# Patient Record
Sex: MALE | Race: ASIAN | NOT HISPANIC OR LATINO | URBAN - METROPOLITAN AREA
[De-identification: names, ages, dates, MRNs, and addresses within clinical notes are randomized per-mention and may not be internally consistent; named-entity substitution may affect disease eponyms.]

---

## 2018-04-26 ENCOUNTER — INPATIENT (INPATIENT)
Facility: HOSPITAL | Age: 58
LOS: 6 days | Discharge: HOME | End: 2018-05-03
Attending: INTERNAL MEDICINE | Admitting: INTERNAL MEDICINE
Payer: SELF-PAY

## 2018-04-26 VITALS
OXYGEN SATURATION: 98 % | RESPIRATION RATE: 36 BRPM | HEART RATE: 92 BPM | DIASTOLIC BLOOD PRESSURE: 108 MMHG | SYSTOLIC BLOOD PRESSURE: 172 MMHG

## 2018-04-26 DIAGNOSIS — I50.9 HEART FAILURE, UNSPECIFIED: ICD-10-CM

## 2018-04-26 DIAGNOSIS — J96.91 RESPIRATORY FAILURE, UNSPECIFIED WITH HYPOXIA: ICD-10-CM

## 2018-04-26 DIAGNOSIS — Z90.49 ACQUIRED ABSENCE OF OTHER SPECIFIED PARTS OF DIGESTIVE TRACT: Chronic | ICD-10-CM

## 2018-04-26 DIAGNOSIS — I48.91 UNSPECIFIED ATRIAL FIBRILLATION: ICD-10-CM

## 2018-04-26 DIAGNOSIS — N17.9 ACUTE KIDNEY FAILURE, UNSPECIFIED: ICD-10-CM

## 2018-04-26 DIAGNOSIS — I25.10 ATHEROSCLEROTIC HEART DISEASE OF NATIVE CORONARY ARTERY WITHOUT ANGINA PECTORIS: ICD-10-CM

## 2018-04-26 DIAGNOSIS — Z29.9 ENCOUNTER FOR PROPHYLACTIC MEASURES, UNSPECIFIED: ICD-10-CM

## 2018-04-26 DIAGNOSIS — Z95.0 PRESENCE OF CARDIAC PACEMAKER: Chronic | ICD-10-CM

## 2018-04-26 DIAGNOSIS — E11.9 TYPE 2 DIABETES MELLITUS WITHOUT COMPLICATIONS: ICD-10-CM

## 2018-04-26 DIAGNOSIS — E87.2 ACIDOSIS: ICD-10-CM

## 2018-04-26 LAB
ALBUMIN SERPL ELPH-MCNC: 4.2 G/DL — SIGNIFICANT CHANGE UP (ref 3.3–5)
ALBUMIN SERPL ELPH-MCNC: 4.3 G/DL — SIGNIFICANT CHANGE UP (ref 3.3–5)
ALP SERPL-CCNC: 104 U/L — SIGNIFICANT CHANGE UP (ref 40–120)
ALP SERPL-CCNC: 76 U/L — SIGNIFICANT CHANGE UP (ref 40–120)
ALT FLD-CCNC: 19 U/L — SIGNIFICANT CHANGE UP (ref 4–41)
ALT FLD-CCNC: 24 U/L — SIGNIFICANT CHANGE UP (ref 4–41)
APTT BLD: 38.6 SEC — HIGH (ref 27.5–37.4)
AST SERPL-CCNC: 25 U/L — SIGNIFICANT CHANGE UP (ref 4–40)
AST SERPL-CCNC: 30 U/L — SIGNIFICANT CHANGE UP (ref 4–40)
B-OH-BUTYR SERPL-SCNC: 0.1 MMOL/L — SIGNIFICANT CHANGE UP (ref 0–0.4)
B-OH-BUTYR SERPL-SCNC: 0.1 MMOL/L — SIGNIFICANT CHANGE UP (ref 0–0.4)
BASE EXCESS BLDV CALC-SCNC: -0.8 MMOL/L — SIGNIFICANT CHANGE UP
BASE EXCESS BLDV CALC-SCNC: -2.5 MMOL/L — SIGNIFICANT CHANGE UP
BASE EXCESS BLDV CALC-SCNC: -7.2 MMOL/L — SIGNIFICANT CHANGE UP
BASOPHILS # BLD AUTO: 0.08 K/UL — SIGNIFICANT CHANGE UP (ref 0–0.2)
BASOPHILS NFR BLD AUTO: 0.6 % — SIGNIFICANT CHANGE UP (ref 0–2)
BASOPHILS NFR SPEC: 0 % — SIGNIFICANT CHANGE UP (ref 0–2)
BILIRUB SERPL-MCNC: 0.4 MG/DL — SIGNIFICANT CHANGE UP (ref 0.2–1.2)
BILIRUB SERPL-MCNC: 0.4 MG/DL — SIGNIFICANT CHANGE UP (ref 0.2–1.2)
BLASTS # FLD: 0 % — SIGNIFICANT CHANGE UP (ref 0–0)
BLOOD GAS VENOUS - CREATININE: 1.8 MG/DL — HIGH (ref 0.5–1.3)
BLOOD GAS VENOUS - CREATININE: 1.82 MG/DL — HIGH (ref 0.5–1.3)
BUN SERPL-MCNC: 30 MG/DL — HIGH (ref 7–23)
BUN SERPL-MCNC: 33 MG/DL — HIGH (ref 7–23)
CALCIUM SERPL-MCNC: 9.1 MG/DL — SIGNIFICANT CHANGE UP (ref 8.4–10.5)
CALCIUM SERPL-MCNC: 9.3 MG/DL — SIGNIFICANT CHANGE UP (ref 8.4–10.5)
CHLORIDE BLDV-SCNC: 104 MMOL/L — SIGNIFICANT CHANGE UP (ref 96–108)
CHLORIDE BLDV-SCNC: 106 MMOL/L — SIGNIFICANT CHANGE UP (ref 96–108)
CHLORIDE SERPL-SCNC: 100 MMOL/L — SIGNIFICANT CHANGE UP (ref 98–107)
CHLORIDE SERPL-SCNC: 104 MMOL/L — SIGNIFICANT CHANGE UP (ref 98–107)
CO2 SERPL-SCNC: 16 MMOL/L — LOW (ref 22–31)
CO2 SERPL-SCNC: 25 MMOL/L — SIGNIFICANT CHANGE UP (ref 22–31)
CREAT SERPL-MCNC: 1.81 MG/DL — HIGH (ref 0.5–1.3)
CREAT SERPL-MCNC: 1.87 MG/DL — HIGH (ref 0.5–1.3)
DIGOXIN SERPL-MCNC: 1 NG/ML — SIGNIFICANT CHANGE UP (ref 0.8–2)
EOSINOPHIL # BLD AUTO: 0.3 K/UL — SIGNIFICANT CHANGE UP (ref 0–0.5)
EOSINOPHIL NFR BLD AUTO: 2.4 % — SIGNIFICANT CHANGE UP (ref 0–6)
EOSINOPHIL NFR FLD: 2.7 % — SIGNIFICANT CHANGE UP (ref 0–6)
GAS PNL BLDV: 140 MMOL/L — SIGNIFICANT CHANGE UP (ref 136–146)
GAS PNL BLDV: 140 MMOL/L — SIGNIFICANT CHANGE UP (ref 136–146)
GAS PNL BLDV: 142 MMOL/L — SIGNIFICANT CHANGE UP (ref 136–146)
GIANT PLATELETS BLD QL SMEAR: PRESENT — SIGNIFICANT CHANGE UP
GLUCOSE BLDC GLUCOMTR-MCNC: 178 MG/DL — HIGH (ref 70–99)
GLUCOSE BLDC GLUCOMTR-MCNC: 226 MG/DL — HIGH (ref 70–99)
GLUCOSE BLDV-MCNC: 119 — HIGH (ref 70–99)
GLUCOSE BLDV-MCNC: 332 — HIGH (ref 70–99)
GLUCOSE BLDV-MCNC: 386 — HIGH (ref 70–99)
GLUCOSE SERPL-MCNC: 137 MG/DL — HIGH (ref 70–99)
GLUCOSE SERPL-MCNC: 377 MG/DL — HIGH (ref 70–99)
HCO3 BLDV-SCNC: 16 MMOL/L — LOW (ref 20–27)
HCO3 BLDV-SCNC: 19 MMOL/L — LOW (ref 20–27)
HCO3 BLDV-SCNC: 22 MMOL/L — SIGNIFICANT CHANGE UP (ref 20–27)
HCT VFR BLD CALC: 41.6 % — SIGNIFICANT CHANGE UP (ref 39–50)
HCT VFR BLD CALC: 45.8 % — SIGNIFICANT CHANGE UP (ref 39–50)
HCT VFR BLDV CALC: 33.2 % — LOW (ref 39–51)
HCT VFR BLDV CALC: 42.3 % — SIGNIFICANT CHANGE UP (ref 39–51)
HCT VFR BLDV CALC: 44.1 % — SIGNIFICANT CHANGE UP (ref 39–51)
HGB BLD-MCNC: 12.9 G/DL — LOW (ref 13–17)
HGB BLD-MCNC: 14.2 G/DL — SIGNIFICANT CHANGE UP (ref 13–17)
HGB BLDV-MCNC: 10.8 G/DL — LOW (ref 13–17)
HGB BLDV-MCNC: 13.8 G/DL — SIGNIFICANT CHANGE UP (ref 13–17)
HGB BLDV-MCNC: 14.4 G/DL — SIGNIFICANT CHANGE UP (ref 13–17)
IMM GRANULOCYTES # BLD AUTO: 0.07 # — SIGNIFICANT CHANGE UP
IMM GRANULOCYTES NFR BLD AUTO: 0.6 % — SIGNIFICANT CHANGE UP (ref 0–1.5)
INR BLD: 1.78 — HIGH (ref 0.88–1.17)
LACTATE BLDV-MCNC: 3.3 MMOL/L — HIGH (ref 0.5–2)
LACTATE BLDV-MCNC: 6.1 MMOL/L — CRITICAL HIGH (ref 0.5–2)
LACTATE SERPL-SCNC: 1.4 MMOL/L — SIGNIFICANT CHANGE UP (ref 0.5–2)
LYMPHOCYTES # BLD AUTO: 47.1 % — HIGH (ref 13–44)
LYMPHOCYTES # BLD AUTO: 5.88 K/UL — HIGH (ref 1–3.3)
LYMPHOCYTES NFR SPEC AUTO: 38 % — SIGNIFICANT CHANGE UP (ref 13–44)
MAGNESIUM SERPL-MCNC: 2 MG/DL — SIGNIFICANT CHANGE UP (ref 1.6–2.6)
MCHC RBC-ENTMCNC: 28.2 PG — SIGNIFICANT CHANGE UP (ref 27–34)
MCHC RBC-ENTMCNC: 28.8 PG — SIGNIFICANT CHANGE UP (ref 27–34)
MCHC RBC-ENTMCNC: 31 % — LOW (ref 32–36)
MCHC RBC-ENTMCNC: 31 % — LOW (ref 32–36)
MCV RBC AUTO: 90.8 FL — SIGNIFICANT CHANGE UP (ref 80–100)
MCV RBC AUTO: 92.9 FL — SIGNIFICANT CHANGE UP (ref 80–100)
METAMYELOCYTES # FLD: 0 % — SIGNIFICANT CHANGE UP (ref 0–1)
MONOCYTES # BLD AUTO: 0.8 K/UL — SIGNIFICANT CHANGE UP (ref 0–0.9)
MONOCYTES NFR BLD AUTO: 6.4 % — SIGNIFICANT CHANGE UP (ref 2–14)
MONOCYTES NFR BLD: 6.2 % — SIGNIFICANT CHANGE UP (ref 2–9)
MORPHOLOGY BLD-IMP: NORMAL — SIGNIFICANT CHANGE UP
MYELOCYTES NFR BLD: 0 % — SIGNIFICANT CHANGE UP (ref 0–0)
NEUTROPHIL AB SER-ACNC: 45.1 % — SIGNIFICANT CHANGE UP (ref 43–77)
NEUTROPHILS # BLD AUTO: 5.35 K/UL — SIGNIFICANT CHANGE UP (ref 1.8–7.4)
NEUTROPHILS NFR BLD AUTO: 42.9 % — LOW (ref 43–77)
NEUTS BAND # BLD: 0 % — SIGNIFICANT CHANGE UP (ref 0–6)
NRBC # FLD: 0 — SIGNIFICANT CHANGE UP
NRBC # FLD: 0.02 — SIGNIFICANT CHANGE UP
NT-PROBNP SERPL-SCNC: 1810 PG/ML — SIGNIFICANT CHANGE UP
OTHER - HEMATOLOGY %: 0 — SIGNIFICANT CHANGE UP
PCO2 BLDV: 56 MMHG — HIGH (ref 41–51)
PCO2 BLDV: 78 MMHG — HIGH (ref 41–51)
PCO2 BLDV: 79 MMHG — HIGH (ref 41–51)
PH BLDV: 7.07 PH — CRITICAL LOW (ref 7.32–7.43)
PH BLDV: 7.14 PH — CRITICAL LOW (ref 7.32–7.43)
PH BLDV: 7.28 PH — LOW (ref 7.32–7.43)
PHOSPHATE SERPL-MCNC: 4.6 MG/DL — HIGH (ref 2.5–4.5)
PLATELET # BLD AUTO: 180 K/UL — SIGNIFICANT CHANGE UP (ref 150–400)
PLATELET # BLD AUTO: 188 K/UL — SIGNIFICANT CHANGE UP (ref 150–400)
PLATELET COUNT - ESTIMATE: NORMAL — SIGNIFICANT CHANGE UP
PMV BLD: 12.1 FL — SIGNIFICANT CHANGE UP (ref 7–13)
PMV BLD: 12.5 FL — SIGNIFICANT CHANGE UP (ref 7–13)
PO2 BLDV: 36 MMHG — SIGNIFICANT CHANGE UP (ref 35–40)
PO2 BLDV: 55 MMHG — HIGH (ref 35–40)
PO2 BLDV: < 24 MMHG — LOW (ref 35–40)
POTASSIUM BLDV-SCNC: 3.3 MMOL/L — LOW (ref 3.4–4.5)
POTASSIUM BLDV-SCNC: 5.1 MMOL/L — HIGH (ref 3.4–4.5)
POTASSIUM BLDV-SCNC: 5.2 MMOL/L — HIGH (ref 3.4–4.5)
POTASSIUM SERPL-MCNC: 3.6 MMOL/L — SIGNIFICANT CHANGE UP (ref 3.5–5.3)
POTASSIUM SERPL-MCNC: 5 MMOL/L — SIGNIFICANT CHANGE UP (ref 3.5–5.3)
POTASSIUM SERPL-SCNC: 3.6 MMOL/L — SIGNIFICANT CHANGE UP (ref 3.5–5.3)
POTASSIUM SERPL-SCNC: 5 MMOL/L — SIGNIFICANT CHANGE UP (ref 3.5–5.3)
PROMYELOCYTES # FLD: 0 % — SIGNIFICANT CHANGE UP (ref 0–0)
PROT SERPL-MCNC: 7.5 G/DL — SIGNIFICANT CHANGE UP (ref 6–8.3)
PROT SERPL-MCNC: 8.1 G/DL — SIGNIFICANT CHANGE UP (ref 6–8.3)
PROTHROM AB SERPL-ACNC: 20 SEC — HIGH (ref 9.8–13.1)
RBC # BLD: 4.58 M/UL — SIGNIFICANT CHANGE UP (ref 4.2–5.8)
RBC # BLD: 4.93 M/UL — SIGNIFICANT CHANGE UP (ref 4.2–5.8)
RBC # FLD: 13.6 % — SIGNIFICANT CHANGE UP (ref 10.3–14.5)
RBC # FLD: 13.7 % — SIGNIFICANT CHANGE UP (ref 10.3–14.5)
SAO2 % BLDV: 22.6 % — LOW (ref 60–85)
SAO2 % BLDV: 59.1 % — LOW (ref 60–85)
SAO2 % BLDV: 72 % — SIGNIFICANT CHANGE UP (ref 60–85)
SMUDGE CELLS # BLD: PRESENT — SIGNIFICANT CHANGE UP
SODIUM SERPL-SCNC: 139 MMOL/L — SIGNIFICANT CHANGE UP (ref 135–145)
SODIUM SERPL-SCNC: 144 MMOL/L — SIGNIFICANT CHANGE UP (ref 135–145)
TROPONIN T SERPL-MCNC: < 0.06 NG/ML — SIGNIFICANT CHANGE UP (ref 0–0.06)
VARIANT LYMPHS # BLD: 8 % — SIGNIFICANT CHANGE UP
WBC # BLD: 12.48 K/UL — HIGH (ref 3.8–10.5)
WBC # BLD: 8.61 K/UL — SIGNIFICANT CHANGE UP (ref 3.8–10.5)
WBC # FLD AUTO: 12.48 K/UL — HIGH (ref 3.8–10.5)
WBC # FLD AUTO: 8.61 K/UL — SIGNIFICANT CHANGE UP (ref 3.8–10.5)

## 2018-04-26 PROCEDURE — 71045 X-RAY EXAM CHEST 1 VIEW: CPT | Mod: 26

## 2018-04-26 PROCEDURE — 99223 1ST HOSP IP/OBS HIGH 75: CPT

## 2018-04-26 RX ORDER — DEXTROSE 50 % IN WATER 50 %
25 SYRINGE (ML) INTRAVENOUS ONCE
Qty: 0 | Refills: 0 | Status: DISCONTINUED | OUTPATIENT
Start: 2018-04-26 | End: 2018-05-03

## 2018-04-26 RX ORDER — CLOPIDOGREL BISULFATE 75 MG/1
1 TABLET, FILM COATED ORAL
Qty: 0 | Refills: 0 | COMMUNITY

## 2018-04-26 RX ORDER — CARVEDILOL PHOSPHATE 80 MG/1
6.25 CAPSULE, EXTENDED RELEASE ORAL EVERY 12 HOURS
Qty: 0 | Refills: 0 | Status: DISCONTINUED | OUTPATIENT
Start: 2018-04-26 | End: 2018-04-26

## 2018-04-26 RX ORDER — INSULIN LISPRO 100/ML
VIAL (ML) SUBCUTANEOUS AT BEDTIME
Qty: 0 | Refills: 0 | Status: DISCONTINUED | OUTPATIENT
Start: 2018-04-26 | End: 2018-05-03

## 2018-04-26 RX ORDER — SODIUM CHLORIDE 9 MG/ML
1000 INJECTION, SOLUTION INTRAVENOUS
Qty: 0 | Refills: 0 | Status: DISCONTINUED | OUTPATIENT
Start: 2018-04-26 | End: 2018-05-03

## 2018-04-26 RX ORDER — INSULIN LISPRO 100/ML
VIAL (ML) SUBCUTANEOUS
Qty: 0 | Refills: 0 | Status: DISCONTINUED | OUTPATIENT
Start: 2018-04-26 | End: 2018-05-03

## 2018-04-26 RX ORDER — DEXTROSE 50 % IN WATER 50 %
12.5 SYRINGE (ML) INTRAVENOUS ONCE
Qty: 0 | Refills: 0 | Status: DISCONTINUED | OUTPATIENT
Start: 2018-04-26 | End: 2018-05-03

## 2018-04-26 RX ORDER — DIGOXIN 250 MCG
0.12 TABLET ORAL DAILY
Qty: 0 | Refills: 0 | Status: DISCONTINUED | OUTPATIENT
Start: 2018-04-26 | End: 2018-05-03

## 2018-04-26 RX ORDER — INFLUENZA VIRUS VACCINE 15; 15; 15; 15 UG/.5ML; UG/.5ML; UG/.5ML; UG/.5ML
0.5 SUSPENSION INTRAMUSCULAR ONCE
Qty: 0 | Refills: 0 | Status: DISCONTINUED | OUTPATIENT
Start: 2018-04-26 | End: 2018-05-03

## 2018-04-26 RX ORDER — ASPIRIN/CALCIUM CARB/MAGNESIUM 324 MG
81 TABLET ORAL DAILY
Qty: 0 | Refills: 0 | Status: DISCONTINUED | OUTPATIENT
Start: 2018-04-26 | End: 2018-05-03

## 2018-04-26 RX ORDER — DEXTROSE 50 % IN WATER 50 %
1 SYRINGE (ML) INTRAVENOUS ONCE
Qty: 0 | Refills: 0 | Status: DISCONTINUED | OUTPATIENT
Start: 2018-04-26 | End: 2018-05-03

## 2018-04-26 RX ORDER — GABAPENTIN 400 MG/1
300 CAPSULE ORAL AT BEDTIME
Qty: 0 | Refills: 0 | Status: DISCONTINUED | OUTPATIENT
Start: 2018-04-26 | End: 2018-04-29

## 2018-04-26 RX ORDER — POTASSIUM CHLORIDE 20 MEQ
40 PACKET (EA) ORAL ONCE
Qty: 0 | Refills: 0 | Status: COMPLETED | OUTPATIENT
Start: 2018-04-26 | End: 2018-04-26

## 2018-04-26 RX ORDER — ATORVASTATIN CALCIUM 80 MG/1
1 TABLET, FILM COATED ORAL
Qty: 0 | Refills: 0 | COMMUNITY

## 2018-04-26 RX ORDER — DIGOXIN 250 MCG
1 TABLET ORAL
Qty: 0 | Refills: 0 | COMMUNITY

## 2018-04-26 RX ORDER — ATORVASTATIN CALCIUM 80 MG/1
40 TABLET, FILM COATED ORAL AT BEDTIME
Qty: 0 | Refills: 0 | Status: DISCONTINUED | OUTPATIENT
Start: 2018-04-26 | End: 2018-05-03

## 2018-04-26 RX ORDER — GLUCAGON INJECTION, SOLUTION 0.5 MG/.1ML
1 INJECTION, SOLUTION SUBCUTANEOUS ONCE
Qty: 0 | Refills: 0 | Status: DISCONTINUED | OUTPATIENT
Start: 2018-04-26 | End: 2018-05-03

## 2018-04-26 RX ORDER — NITROGLYCERIN 6.5 MG
10 CAPSULE, EXTENDED RELEASE ORAL
Qty: 50 | Refills: 0 | Status: DISCONTINUED | OUTPATIENT
Start: 2018-04-26 | End: 2018-04-26

## 2018-04-26 RX ORDER — WARFARIN SODIUM 2.5 MG/1
5 TABLET ORAL ONCE
Qty: 0 | Refills: 0 | Status: COMPLETED | OUTPATIENT
Start: 2018-04-26 | End: 2018-04-26

## 2018-04-26 RX ORDER — NITROGLYCERIN 6.5 MG
100 CAPSULE, EXTENDED RELEASE ORAL
Qty: 50 | Refills: 0 | Status: DISCONTINUED | OUTPATIENT
Start: 2018-04-26 | End: 2018-04-26

## 2018-04-26 RX ORDER — ASPIRIN/CALCIUM CARB/MAGNESIUM 324 MG
1 TABLET ORAL
Qty: 0 | Refills: 0 | COMMUNITY

## 2018-04-26 RX ORDER — ISOSORBIDE DINITRATE 5 MG/1
10 TABLET ORAL THREE TIMES A DAY
Qty: 0 | Refills: 0 | Status: DISCONTINUED | OUTPATIENT
Start: 2018-04-26 | End: 2018-04-28

## 2018-04-26 RX ORDER — WARFARIN SODIUM 2.5 MG/1
1 TABLET ORAL
Qty: 0 | Refills: 0 | COMMUNITY

## 2018-04-26 RX ORDER — CLOPIDOGREL BISULFATE 75 MG/1
75 TABLET, FILM COATED ORAL DAILY
Qty: 0 | Refills: 0 | Status: DISCONTINUED | OUTPATIENT
Start: 2018-04-26 | End: 2018-05-03

## 2018-04-26 RX ORDER — CARVEDILOL PHOSPHATE 80 MG/1
3.12 CAPSULE, EXTENDED RELEASE ORAL EVERY 12 HOURS
Qty: 0 | Refills: 0 | Status: DISCONTINUED | OUTPATIENT
Start: 2018-04-26 | End: 2018-05-02

## 2018-04-26 RX ORDER — GABAPENTIN 400 MG/1
1 CAPSULE ORAL
Qty: 0 | Refills: 0 | COMMUNITY

## 2018-04-26 RX ORDER — FUROSEMIDE 40 MG
40 TABLET ORAL ONCE
Qty: 0 | Refills: 0 | Status: COMPLETED | OUTPATIENT
Start: 2018-04-26 | End: 2018-04-26

## 2018-04-26 RX ORDER — HEPARIN SODIUM 5000 [USP'U]/ML
5000 INJECTION INTRAVENOUS; SUBCUTANEOUS EVERY 8 HOURS
Qty: 0 | Refills: 0 | Status: DISCONTINUED | OUTPATIENT
Start: 2018-04-26 | End: 2018-04-26

## 2018-04-26 RX ORDER — FUROSEMIDE 40 MG
40 TABLET ORAL
Qty: 0 | Refills: 0 | Status: DISCONTINUED | OUTPATIENT
Start: 2018-04-26 | End: 2018-04-28

## 2018-04-26 RX ADMIN — CLOPIDOGREL BISULFATE 75 MILLIGRAM(S): 75 TABLET, FILM COATED ORAL at 12:34

## 2018-04-26 RX ADMIN — Medication 40 MILLIGRAM(S): at 17:50

## 2018-04-26 RX ADMIN — Medication 40 MILLIGRAM(S): at 10:43

## 2018-04-26 RX ADMIN — Medication 30 MICROGRAM(S)/MIN: at 05:06

## 2018-04-26 RX ADMIN — Medication 40 MILLIGRAM(S): at 05:06

## 2018-04-26 RX ADMIN — Medication 81 MILLIGRAM(S): at 12:34

## 2018-04-26 RX ADMIN — Medication 40 MILLIEQUIVALENT(S): at 15:35

## 2018-04-26 RX ADMIN — GABAPENTIN 300 MILLIGRAM(S): 400 CAPSULE ORAL at 22:41

## 2018-04-26 RX ADMIN — ISOSORBIDE DINITRATE 10 MILLIGRAM(S): 5 TABLET ORAL at 10:43

## 2018-04-26 RX ADMIN — WARFARIN SODIUM 5 MILLIGRAM(S): 2.5 TABLET ORAL at 17:50

## 2018-04-26 RX ADMIN — CARVEDILOL PHOSPHATE 3.12 MILLIGRAM(S): 80 CAPSULE, EXTENDED RELEASE ORAL at 17:50

## 2018-04-26 RX ADMIN — ATORVASTATIN CALCIUM 40 MILLIGRAM(S): 80 TABLET, FILM COATED ORAL at 22:41

## 2018-04-26 RX ADMIN — ISOSORBIDE DINITRATE 10 MILLIGRAM(S): 5 TABLET ORAL at 22:41

## 2018-04-26 RX ADMIN — Medication 1: at 17:57

## 2018-04-26 NOTE — H&P ADULT - NSHPLABSRESULTS_GEN_ALL_CORE
LABS                                            14.2                  Neurophils% (auto):   42.9   (04-26 @ 04:30):    12.48)-----------(188          Lymphocytes% (auto):  47.1                                          45.8                   Eosinphils% (auto):   2.4      Manual%: Neutrophils 45.1 ; Lymphocytes 38.0 ; Eosinophils 2.7  ; Bands%: 0    ; Blasts 0                                    139    |  100    |  30                  Calcium: 9.1   / iCa: x      (04-26 @ 04:30)    ----------------------------<  377       Magnesium: x                                5.0     |  16     |  1.81             Phosphorous: x        TPro  8.1    /  Alb  4.3    /  TBili  0.4    /  DBili  x      /  AST  30     /  ALT  24     /  AlkPhos  104    26 Apr 2018 04:30    ( 04-26 @ 04:30 )   PT: 20.0 SEC;   INR: 1.78   aPTT: 38.6 SEC LABS                                            14.2                  Neurophils% (auto):   42.9   (04-26 @ 04:30):    12.48)-----------(188          Lymphocytes% (auto):  47.1                                          45.8                   Eosinphils% (auto):   2.4      Manual%: Neutrophils 45.1 ; Lymphocytes 38.0 ; Eosinophils 2.7  ; Bands%: 0    ; Blasts 0                                    139    |  100    |  30                  Calcium: 9.1   / iCa: x      (04-26 @ 04:30)    ----------------------------<  377       Magnesium: x                                5.0     |  16     |  1.81             Phosphorous: x        TPro  8.1    /  Alb  4.3    /  TBili  0.4    /  DBili  x      /  AST  30     /  ALT  24     /  AlkPhos  104    26 Apr 2018 04:30    ( 04-26 @ 04:30 )   PT: 20.0 SEC;   INR: 1.78   aPTT: 38.6 SEC    Blood Gas Venous Comprehensive (04.26.18 @ 05:42)    Blood Gas Venous - Lactate: 3.3: Please note updated reference range. mmol/L    pH, Venous: 7.14 pH    pCO2, Venous: 79 mmHg    pO2, Venous: < 24 mmHg    HCO3, Venous: 19 mmol/L LABS                                            14.2                  Neurophils% (auto):   42.9   (04-26 @ 04:30):    12.48)-----------(188          Lymphocytes% (auto):  47.1                                          45.8                   Eosinphils% (auto):   2.4      Manual%: Neutrophils 45.1 ; Lymphocytes 38.0 ; Eosinophils 2.7  ; Bands%: 0    ; Blasts 0                                    139    |  100    |  30                  Calcium: 9.1   / iCa: x      (04-26 @ 04:30)    ----------------------------<  377       Magnesium: x                                5.0     |  16     |  1.81             Phosphorous: x        TPro  8.1    /  Alb  4.3    /  TBili  0.4    /  DBili  x      /  AST  30     /  ALT  24     /  AlkPhos  104    26 Apr 2018 04:30    ( 04-26 @ 04:30 )   PT: 20.0 SEC;   INR: 1.78   aPTT: 38.6 SEC    Blood Gas Venous Comprehensive (04.26.18 @ 05:42)    Blood Gas Venous - Lactate: 3.3: Please note updated reference range. mmol/L    pH, Venous: 7.14 pH    pCO2, Venous: 79 mmHg    pO2, Venous: < 24 mmHg    HCO3, Venous: 19 mmol/L    BNP 1810.     < from: Xray Chest 1 View-PORTABLE IMMEDIATE (04.26.18 @ 04:53) >    FINDINGS:     Left chest wall AICD. The cardiomediastinal silhouette is not well   evaluated in this projection. There is no pleural effusion. There is no   pneumothorax. No focal consolidation identified. Increased initial lung   markings compatible with pulmonary edema. There are degenerative changes   of the visualized thoracic spine.    IMPRESSION:   Pulmonary edema.    < end of copied text >

## 2018-04-26 NOTE — H&P ADULT - NSHPPHYSICALEXAM_GEN_ALL_CORE
Vital Signs Last 24 Hrs  T(C): 36.2 (26 Apr 2018 09:15), Max: 36.2 (26 Apr 2018 09:15)  T(F): 97.2 (26 Apr 2018 09:15), Max: 97.2 (26 Apr 2018 09:15)  HR: 79 (26 Apr 2018 09:15) (71 - 92)  BP: 110/70 (26 Apr 2018 09:15) (110/70 - 172/108)  BP(mean): 77 (26 Apr 2018 09:15) (77 - 82)  RR: 23 (26 Apr 2018 09:15) (14 - 36)  SpO2: 100% (26 Apr 2018 09:15) (98% - 100%)  I&O's Summary    26 Apr 2018 07:01  -  26 Apr 2018 09:55  --------------------------------------------------------  IN: 0 mL / OUT: 950 mL / NET: -950 mL    Appearance: NAD	  HEENT:   Normal oral mucosa, PERRL, EOMI	  Lymphatic: No lymphadenopathy  Cardiovascular: Normal S1 S2, No JVD, No murmurs, No edema  Respiratory: Lungs clear to auscultation	  Psychiatry: A & O x 3, Mood & affect appropriate  Gastrointestinal:  soft nt nd, normoactive bs  Skin: No rashes, No ecchymoses, No cyanosis	  Neurologic: Non-focal  Extremities: Normal range of motion, No clubbing, cyanosis  Vascular: Peripheral pulses palpable 2+ bilaterally Vital Signs Last 24 Hrs  T(C): 36.2 (26 Apr 2018 09:15), Max: 36.2 (26 Apr 2018 09:15)  T(F): 97.2 (26 Apr 2018 09:15), Max: 97.2 (26 Apr 2018 09:15)  HR: 79 (26 Apr 2018 09:15) (71 - 92)  BP: 110/70 (26 Apr 2018 09:15) (110/70 - 172/108)  BP(mean): 77 (26 Apr 2018 09:15) (77 - 82)  RR: 23 (26 Apr 2018 09:15) (14 - 36)  SpO2: 100% (26 Apr 2018 09:15) (98% - 100%)  I&O's Summary    26 Apr 2018 07:01  -  26 Apr 2018 09:55  --------------------------------------------------------  IN: 0 mL / OUT: 950 mL / NET: -950 mL    Appearance: NAD	  Cardiovascular: Normal S1 S2. No JVD or peripheral edema appreciated. No murmurs.  Respiratory: Lungs clear to auscultation.	  Psychiatry: A & O x 3, Mood & affect appropriate  Gastrointestinal:  soft nt nd, normoactive bs  Skin: No rashes, No ecchymoses, No cyanosis	  Neurologic: Non-focal  Extremities: Normal range of motion, No clubbing, cyanosis  Vascular: Peripheral pulses palpable 2+ bilaterally

## 2018-04-26 NOTE — H&P ADULT - NSHPREVIEWOFSYSTEMS_GEN_ALL_CORE
Constitutional: [ ] Fever [ ] Chills [ ] Fatigue [ ] Weight change   HEENT: [ ] Blurred vision [ ] Eye Pain [ ] Headache [ ] Runny nose [ ] Sore Throat   Respiratory: [ ] Cough [ ] Wheezing [ ] Shortness of breath  Cardiovascular: [ ] Chest Pain [ ] Palpitations [ ] ALBERTO [ ] PND [ ] Orthopnea  Gastrointestinal: [ ] Abdominal Pain [ ] Diarrhea [ ] Constipation [ ] Hemorrhoids [ ] Nausea [ ] Vomiting  Genitourinary: [ ] Nocturia [ ] Dysuria [ ] Incontinence  Extremities: [ ] Swelling [ ] Joint Pain  Neurologic: [ ] Focal deficit [ ] Paresthesias [ ] Syncope  Lymphatic: [ ] Swelling [ ] Lymphadenopathy   Skin: [ ] Rash [ ] Ecchymoses [ ] Wounds [ ] Lesions  Psychiatry: [ ] Depression [ ] Suicidal/Homicidal Ideation [ ] Anxiety [ ] Sleep Disturbances  [ ] 10 point review of systems is otherwise negative except as mentioned above            [ ]Unable to obtain Constitutional: [-] Fever [-] Chills [+] Fatigue   HEENT: [-] Blurred vision [-] Headache [-] Runny nose [-] Sore Throat   Respiratory: [-] Cough [-] Wheezing [+] Shortness of breath  Cardiovascular: [-] Chest Pain [-] Palpitations [-] ALBERTO [+] PND [-] Orthopnea  Gastrointestinal: [-] Abdominal Pain [-] Nausea [-] Vomiting  Neurologic: [-] Focal deficit [-] Paresthesias [-] Syncope  Skin: [-] Rash

## 2018-04-26 NOTE — ED PROVIDER NOTE - OBJECTIVE STATEMENT
ATTG NOTE DR. ARREOLA 57M presents to the ED in acute respiratory distress, unable to speak full sentences as per EMS and hypoxic to 60%, started on CPAP 10 in the field.  No fever, no chills, pt unable to provide any history.

## 2018-04-26 NOTE — CONSULT NOTE ADULT - SUBJECTIVE AND OBJECTIVE BOX
HISTORY OF PRESENT ILLNESS:  Outpatient Cardiologist:   Patient is a 57y old  Male PMH CAD, CHF (EF 25%) who presents with a chief complaint of SOB  HPI: 57M presents to the ED in acute respiratory distress, unable to speak full sentences as per EMS and hypoxic to 60%, started on CPAP 10 in the field.  No fever, no chills, pt unable to provide any history.    ED vitals: T 97, HR 84, /108 (started nitro gtt), RR 36, SpO2 98%.   ED Course: WBC 12.48, BUN 30, creat 1.81, BNP 1810, Trop negative Lactate 6.1--> 3.3, PH 7.07--> 7.14, CXR showed pulmonary edema    Allergies  iodine (Unknown)    MEDICATIONS    nitroglycerin  Infusion 100 MICROgram(s)/Min IV Continuous <Continuous    PAST MEDICAL & SURGICAL HISTORY:  CHF (congestive heart failure)  CAD (coronary artery disease)  Artificial cardiac pacemaker    FAMILY HISTORY:    SOCIAL HISTORY  Marital Status:   Occupation:   Lives with:     SUBSTANCE USE  Tobacco Usage:  ( ) never smoked   ( ) former smoker  ( ) current smoker; Packs per day:   Alcohol Usage: ( ) none  ( ) occasional ( ) 2-3 times a week ( ) daily; Last drink:   Recreational drugs ( ) None    REVIEW OF SYSTEMS:  CONSTITUTIONAL: No fevers, No chills, No fatigue, No weight gain  EYES: No vision changes   ENT: No congestion, No ear pain, No sore throat.  NECK: No pain, No stiffness  RESPIRATORY: No shortness of breath, No cough, No wheezing, No hemoptysis  CARDIOVASCULAR: No chest pain. No palpitations, No ALBERTO, No orthopnea, No paroxysmal nocturnal dyspnea, No pleuritic pain  GASTROINTESTINAL: No abdominal pain, No nausea, No vomiting, No hematemesis, No diarrhea No constipation. No melena  GENITOURINARY: No dysuria, No frequency, No incontinence, No hematuria  NEUROLOGICAL: No dizziness, No lightheadedness, No syncope, No LOC, No headache, No numbness or weakness  MUSCULOSKELETAL: No joint pain, No joint swelling.  PSYCHIATRIC: No anxiety, No depression  SKIN: No diaphoresis. No itching, No rashes, No pressure ulcers    All other review of systems is negative unless indicated above.    VITAL SIGNS  T(C): 36.1 (04-26-18 @ 05:04), Max: 36.1 (04-26-18 @ 05:04)  HR: 80 (04-26-18 @ 06:27) (77 - 92)  BP: 131/86 (04-26-18 @ 06:27) (120/79 - 172/108)  RR: 24 (04-26-18 @ 06:27) (23 - 36)  SpO2: 100% (04-26-18 @ 06:27) (98% - 100%)  Wt(kg): --    PHYSICAL EXAM:  Appearance: NAD, no distress  HEENT:   Normal oral mucosa, PERRL, EOMI  Cardiovascular: Regular rate and rhythm, Normal S1 S2, No JVD, No murmurs, No edema  Respiratory: Lungs clear to auscultation. No rales, No rhonchi, No wheezing. No tenderness to palpation  Gastrointestinal:  Soft, Non-tender, + BS  Neurologic: Non-focal, A&Ox3  Skin: Warm and dry, No rashes, No ecchymoses, No cyanosis  Extremities: No clubbing, cyanosis or edema  Vascular: Peripheral pulses palpable 2+ bilaterally  Psychiatry: Mood & affect appropriate    LABORATORY VALUES	 	                        14.2   12.48 )-----------( 188      ( 26 Apr 2018 04:30 )             45.8     04-26    139  |  100  |  30<H>  ----------------------------<  377<H>  5.0   |  16<L>  |  1.81<H>    Ca    9.1      26 Apr 2018 04:30    TPro  8.1  /  Alb  4.3  /  TBili  0.4  /  DBili  x   /  AST  30  /  ALT  24  /  AlkPhos  104  04-26  LIVER FUNCTIONS - ( 26 Apr 2018 04:30 )  Alb: 4.3 g/dL / Pro: 8.1 g/dL / ALK PHOS: 104 u/L / ALT: 24 u/L / AST: 30 u/L / GGT: x         Prothrombin Time, Plasma: 20.0 SEC (04-26 @ 04:30)    CARDIAC MARKERS:  Troponin T, Serum: < 0.06 ng/mL (04-26 @ 04:30)  Serum Pro-Brain Natriuretic Peptide: 1810 pg/mL (04-26 @ 04:30)  Blood Gas Venous - Lactate: 3.3 mmol/L (04-26 @ 05:42)  Blood Gas Venous - Lactate: 6.1 mmol/L (04-26 @ 04:30)    TELEMETRY: 	    ECG:  	  RADIOLOGY:  OTHER: 	    PREVIOUS DIAGNOSTIC TESTING:    [ ] Echocardiogram:  [ ] Catheterization:  [ ] Stress Test:  	    ASSESSMENT AND PLAN      PLAN  	      # [id number] HISTORY OF PRESENT ILLNESS:  Outpatient Cardiologist:   Patient is a 57y old  Male PMH CAD, CHF (EF 25%) who presents with a chief complaint of SOB  HPI: 57M presents to the ED in acute respiratory distress, unable to speak full sentences as per EMS and hypoxic to 60%, started on CPAP 10 in the field.  No fever, no chills, pt unable to provide any history.   Started BiPAP. BP 170s. Nitro gtt started at 100 mcg in ER. BP currently 140s. Lasix 40 mg IVP given x 1. Patient feeling better    ED vitals: T 97, HR 84, /108 (started nitro gtt), RR 36, SpO2 98%.   ED Course: WBC 12.48, BUN 30, creat 1.81, BNP 1810, Trop negative Lactate 6.1--> 3.3, PH 7.07--> 7.14, CXR showed pulmonary edema    Allergies  iodine (Unknown)    MEDICATIONS    nitroglycerin  Infusion 100 MICROgram(s)/Min IV Continuous <Continuous    PAST MEDICAL & SURGICAL HISTORY:  CHF (congestive heart failure)  CAD (coronary artery disease)  Artificial cardiac pacemaker    FAMILY HISTORY:    SOCIAL HISTORY  Marital Status:   Occupation:   Lives with:     SUBSTANCE USE  Tobacco Usage:  ( ) never smoked   ( ) former smoker  ( ) current smoker; Packs per day:   Alcohol Usage: ( ) none  ( ) occasional ( ) 2-3 times a week ( ) daily; Last drink:   Recreational drugs ( ) None    REVIEW OF SYSTEMS:  CONSTITUTIONAL: No fevers, No chills, No fatigue, No weight gain  EYES: No vision changes   ENT: No congestion, No ear pain, No sore throat.  NECK: No pain, No stiffness  RESPIRATORY: No shortness of breath, No cough, No wheezing, No hemoptysis  CARDIOVASCULAR: No chest pain. No palpitations, No ALBERTO, No orthopnea, No paroxysmal nocturnal dyspnea, No pleuritic pain  GASTROINTESTINAL: No abdominal pain, No nausea, No vomiting, No hematemesis, No diarrhea No constipation. No melena  GENITOURINARY: No dysuria, No frequency, No incontinence, No hematuria  NEUROLOGICAL: No dizziness, No lightheadedness, No syncope, No LOC, No headache, No numbness or weakness  MUSCULOSKELETAL: No joint pain, No joint swelling.  PSYCHIATRIC: No anxiety, No depression  SKIN: No diaphoresis. No itching, No rashes, No pressure ulcers    All other review of systems is negative unless indicated above.    VITAL SIGNS  T(C): 36.1 (04-26-18 @ 05:04), Max: 36.1 (04-26-18 @ 05:04)  HR: 80 (04-26-18 @ 06:27) (77 - 92)  BP: 131/86 (04-26-18 @ 06:27) (120/79 - 172/108)  RR: 24 (04-26-18 @ 06:27) (23 - 36)  SpO2: 100% (04-26-18 @ 06:27) (98% - 100%)  Wt(kg): --    PHYSICAL EXAM:  Appearance: NAD, no distress  HEENT:   Normal oral mucosa, PERRL, EOMI  Cardiovascular: Regular rate and rhythm, Normal S1 S2, No JVD, No murmurs, No edema  Respiratory: Lungs clear to auscultation. No rales, No rhonchi, No wheezing. No tenderness to palpation  Gastrointestinal:  Soft, Non-tender, + BS  Neurologic: Non-focal, A&Ox3  Skin: Warm and dry, No rashes, No ecchymoses, No cyanosis  Extremities: No clubbing, cyanosis or edema  Vascular: Peripheral pulses palpable 2+ bilaterally  Psychiatry: Mood & affect appropriate    LABORATORY VALUES	 	                        14.2   12.48 )-----------( 188      ( 26 Apr 2018 04:30 )             45.8     04-26    139  |  100  |  30<H>  ----------------------------<  377<H>  5.0   |  16<L>  |  1.81<H>    Ca    9.1      26 Apr 2018 04:30    TPro  8.1  /  Alb  4.3  /  TBili  0.4  /  DBili  x   /  AST  30  /  ALT  24  /  AlkPhos  104  04-26  LIVER FUNCTIONS - ( 26 Apr 2018 04:30 )  Alb: 4.3 g/dL / Pro: 8.1 g/dL / ALK PHOS: 104 u/L / ALT: 24 u/L / AST: 30 u/L / GGT: x         Prothrombin Time, Plasma: 20.0 SEC (04-26 @ 04:30)    CARDIAC MARKERS:  Troponin T, Serum: < 0.06 ng/mL (04-26 @ 04:30)  Serum Pro-Brain Natriuretic Peptide: 1810 pg/mL (04-26 @ 04:30)  Blood Gas Venous - Lactate: 3.3 mmol/L (04-26 @ 05:42)  Blood Gas Venous - Lactate: 6.1 mmol/L (04-26 @ 04:30)    TELEMETRY: 	    ECG:  	  RADIOLOGY:  OTHER: 	    PREVIOUS DIAGNOSTIC TESTING:    [ ] Echocardiogram:  [ ] Catheterization:  [ ] Stress Test:  	    ASSESSMENT AND PLAN      PLAN  	      # [id number] HISTORY OF PRESENT ILLNESS:  Outpatient Cardiologist:   Patient is a 57y old  Male PMH CAD, CHF (EF 25%) who presents with a chief complaint of SOB  HPI: 57M presents to the ED in acute respiratory distress, unable to speak full sentences as per EMS and hypoxic to 60%, started on CPAP 10 in the field.  No fever, no chills, pt unable to provide any history.   Started BiPAP. BP 170s. Nitro gtt started at 100 mcg in ER. BP currently 140s. Lasix 40 mg IVP given x 1. Patient feeling better, currently on nitro 75 mcg.     ED vitals: T 97, HR 84, /108 (started nitro gtt), RR 36, SpO2 98%.   ED Course: WBC 12.48, BUN 30, creat 1.81, BNP 1810, Trop negative Lactate 6.1--> 3.3, PH 7.07--> 7.14, CXR showed pulmonary edema    Allergies  iodine (Unknown)    MEDICATIONS    nitroglycerin  Infusion 100 MICROgram(s)/Min IV Continuous <Continuous      HOME MEDICATIONS    1. Glipizide 5 mg PO daily    PAST MEDICAL & SURGICAL HISTORY:  CHF (congestive heart failure)  CAD (coronary artery disease)  Artificial cardiac pacemaker    FAMILY HISTORY:    SOCIAL HISTORY  Marital Status:   Occupation:   Lives with:     SUBSTANCE USE  Tobacco Usage:  ( ) never smoked   ( ) former smoker  ( ) current smoker; Packs per day:   Alcohol Usage: ( ) none  ( ) occasional ( ) 2-3 times a week ( ) daily; Last drink:   Recreational drugs ( ) None    REVIEW OF SYSTEMS:  CONSTITUTIONAL: No fevers, No chills, No fatigue, No weight gain  EYES: No vision changes   ENT: No congestion, No ear pain, No sore throat.  NECK: No pain, No stiffness  RESPIRATORY: No shortness of breath, No cough, No wheezing, No hemoptysis  CARDIOVASCULAR: No chest pain. No palpitations, No ALBERTO, No orthopnea, No paroxysmal nocturnal dyspnea, No pleuritic pain  GASTROINTESTINAL: No abdominal pain, No nausea, No vomiting, No hematemesis, No diarrhea No constipation. No melena  GENITOURINARY: No dysuria, No frequency, No incontinence, No hematuria  NEUROLOGICAL: No dizziness, No lightheadedness, No syncope, No LOC, No headache, No numbness or weakness  MUSCULOSKELETAL: No joint pain, No joint swelling.  PSYCHIATRIC: No anxiety, No depression  SKIN: No diaphoresis. No itching, No rashes, No pressure ulcers    All other review of systems is negative unless indicated above.    VITAL SIGNS  T(C): 36.1 (04-26-18 @ 05:04), Max: 36.1 (04-26-18 @ 05:04)  HR: 80 (04-26-18 @ 06:27) (77 - 92)  BP: 131/86 (04-26-18 @ 06:27) (120/79 - 172/108)  RR: 24 (04-26-18 @ 06:27) (23 - 36)  SpO2: 100% (04-26-18 @ 06:27) (98% - 100%)  Wt(kg): --    PHYSICAL EXAM:  Appearance: NAD, no distress  HEENT:   Normal oral mucosa, PERRL, EOMI  Cardiovascular: Regular rate and rhythm, Normal S1 S2, No JVD, No murmurs, No edema  Respiratory: Lungs clear to auscultation. No rales, No rhonchi, No wheezing. No tenderness to palpation  Gastrointestinal:  Soft, Non-tender, + BS  Neurologic: Non-focal, A&Ox3  Skin: Warm and dry, No rashes, No ecchymoses, No cyanosis  Extremities: No clubbing, cyanosis or edema  Vascular: Peripheral pulses palpable 2+ bilaterally  Psychiatry: Mood & affect appropriate    LABORATORY VALUES	 	                        14.2   12.48 )-----------( 188      ( 26 Apr 2018 04:30 )             45.8     04-26    139  |  100  |  30<H>  ----------------------------<  377<H>  5.0   |  16<L>  |  1.81<H>    Ca    9.1      26 Apr 2018 04:30    TPro  8.1  /  Alb  4.3  /  TBili  0.4  /  DBili  x   /  AST  30  /  ALT  24  /  AlkPhos  104  04-26  LIVER FUNCTIONS - ( 26 Apr 2018 04:30 )  Alb: 4.3 g/dL / Pro: 8.1 g/dL / ALK PHOS: 104 u/L / ALT: 24 u/L / AST: 30 u/L / GGT: x         Prothrombin Time, Plasma: 20.0 SEC (04-26 @ 04:30)    CARDIAC MARKERS:  Troponin T, Serum: < 0.06 ng/mL (04-26 @ 04:30)  Serum Pro-Brain Natriuretic Peptide: 1810 pg/mL (04-26 @ 04:30)  Blood Gas Venous - Lactate: 3.3 mmol/L (04-26 @ 05:42)  Blood Gas Venous - Lactate: 6.1 mmol/L (04-26 @ 04:30)    TELEMETRY: 	    ECG:  	  RADIOLOGY:  OTHER: 	    PREVIOUS DIAGNOSTIC TESTING:    [ ] Echocardiogram:  [ ] Catheterization:  [ ] Stress Test:  	    ASSESSMENT AND PLAN      PLAN  	      # [id number] HISTORY OF PRESENT ILLNESS:  Outpatient Cardiologist:   Patient is a 57y old  Male who presents with a chief complaint of SOB  HPI: 57M PMH CAD, CHF (EF 25%), PPM, DM 2, HLD, Afib (On Coumadin), CKD, presents to the ED in acute respiratory distress, unable to speak full sentences as per EMS and hypoxic to 60%, started on CPAP 10 in the field.  In ER, patient was started BiPAP. BP 170s. Nitro gtt started at 100 mcg in ER. BP currently 140s. Lasix 40 mg IVP given x 1. Patient feeling better, currently on nitro 75 mcg and is continuing on BiPAP. Patient currently speaking in full sentences. As per patient, he went to bed at 3 am and woke up at 4 am with acute onset of SOB and diaphoresis. Patient felt ok yesterday otherwise.     Denies fever, chills, chest pain, palpitation, orthopnea, PND, dizziness, lightheadedness, weakness, nausea, vomiting, diarrhea, constipation, abdominal pain, bladder and bowel problems, leg swelling, sick contact, recent travel.    ED vitals: T 97, HR 84, /108 (started nitro gtt), RR 36, SpO2 98%.   ED Course: WBC 12.48, BUN 30, creat 1.81, BNP 1810, Trop negative Lactate 6.1--> 3.3, PH 7.07--> 7.14, CXR showed pulmonary edema    Allergies  iodine (Unknown)    MEDICATIONS    nitroglycerin  Infusion 100 MICROgram(s)/Min IV Continuous <Continuous    HOME MEDICATIONS    1. Glipizide 5 mg PO daily  2. Coreg 12.5 mg PO BID  3. Lasix 20 mg PO daily  4. Gabapentin 300 mg PO HS  5. Lipitor 40 mg PO daily  6. Plavix 75 mg PO daily  7. Coumadin 5 mg PO 4 x week and 7.5 mg PO 3 x week  8. Kril Oil    PAST MEDICAL & SURGICAL HISTORY:  CHF (congestive heart failure)  CAD (coronary artery disease)  Artificial cardiac pacemaker    FAMILY HISTORY:   Heart disease for mother and father    SOCIAL HISTORY  Marital Status:   Occupation:   Lives with:     SUBSTANCE USE  Tobacco Usage:  ( ) never smoked   (x ) former smoker  ( ) current smoker; Packs per day:   Alcohol Usage: (x ) none  ( ) occasional ( ) 2-3 times a week ( ) daily; Last drink:   Recreational drugs ( x) None    REVIEW OF SYSTEMS:  CONSTITUTIONAL: No fevers, No chills, No fatigue, No weight gain  EYES: No vision changes   ENT: No congestion, No ear pain, No sore throat.  NECK: No pain, No stiffness  RESPIRATORY: + shortness of breath, No cough, No wheezing, No hemoptysis  CARDIOVASCULAR: No chest pain. No palpitations, No ALBERTO, No orthopnea, No paroxysmal nocturnal dyspnea, No pleuritic pain  GASTROINTESTINAL: No abdominal pain, No nausea, No vomiting, No hematemesis, No diarrhea No constipation. No melena  GENITOURINARY: No dysuria, No frequency, No incontinence, No hematuria  NEUROLOGICAL: No dizziness, No lightheadedness, No syncope, No LOC, No headache, No numbness or weakness  MUSCULOSKELETAL: No joint pain, No joint swelling.  PSYCHIATRIC: No anxiety, No depression  SKIN: + diaphoresis. No itching, No rashes, No pressure ulcers    All other review of systems is negative unless indicated above.    VITAL SIGNS  T(C): 36.1 (04-26-18 @ 05:04), Max: 36.1 (04-26-18 @ 05:04)  HR: 80 (04-26-18 @ 06:27) (77 - 92)  BP: 131/86 (04-26-18 @ 06:27) (120/79 - 172/108)  RR: 24 (04-26-18 @ 06:27) (23 - 36)  SpO2: 100% (04-26-18 @ 06:27) (98% - 100%)  Wt(kg): --    PHYSICAL EXAM:  Appearance: NAD, mild resp distress  HEENT:   Normal oral mucosa, PERRL, EOMI  Cardiovascular: Regular rate and rhythm, Normal S1 S2, No JVD, No murmurs, No edema  Respiratory: Crackes mid way down, No rhonchi, No wheezing. No tenderness to palpation  Gastrointestinal:  Soft, Non-tender, + BS  Neurologic: Non-focal, A&Ox3  Skin: Warm and dry, No rashes, No ecchymoses, No cyanosis  Extremities: No clubbing, cyanosis or edema  Vascular: Peripheral pulses palpable 2+ bilaterally  Psychiatry: Mood & affect appropriate    LABORATORY VALUES	 	                        14.2   12.48 )-----------( 188      ( 26 Apr 2018 04:30 )             45.8     04-26    139  |  100  |  30<H>  ----------------------------<  377<H>  5.0   |  16<L>  |  1.81<H>    Ca    9.1      26 Apr 2018 04:30    TPro  8.1  /  Alb  4.3  /  TBili  0.4  /  DBili  x   /  AST  30  /  ALT  24  /  AlkPhos  104  04-26  LIVER FUNCTIONS - ( 26 Apr 2018 04:30 )  Alb: 4.3 g/dL / Pro: 8.1 g/dL / ALK PHOS: 104 u/L / ALT: 24 u/L / AST: 30 u/L / GGT: x         Prothrombin Time, Plasma: 20.0 SEC (04-26 @ 04:30)    CARDIAC MARKERS:  Troponin T, Serum: < 0.06 ng/mL (04-26 @ 04:30)  Serum Pro-Brain Natriuretic Peptide: 1810 pg/mL (04-26 @ 04:30)  Blood Gas Venous - Lactate: 3.3 mmol/L (04-26 @ 05:42)  Blood Gas Venous - Lactate: 6.1 mmol/L (04-26 @ 04:30)    TELEMETRY: 	    ECG:  	  RADIOLOGY:  OTHER: 	    PREVIOUS DIAGNOSTIC TESTING:    [ ] Echocardiogram:  [ ] Catheterization:  [ ] Stress Test:  	    ASSESSMENT AND PLAN      PLAN  	      # [id number] HISTORY OF PRESENT ILLNESS:  Outpatient Cardiologist:   Patient is a 57y old  Male who presents with a chief complaint of SOB  HPI: 57M PMH CAD, CHF (EF 25%), PPM, DM 2, HLD, Afib (On Coumadin), CKD, presents to the ED in acute respiratory distress, unable to speak full sentences as per EMS and hypoxic to 60%, started on CPAP 10 in the field.  In ER, patient was started BiPAP. BP 170s. Nitro gtt started at 100 mcg in ER. BP currently 140s. Lasix 40 mg IVP given x 1. Patient feeling better, currently on nitro 75 mcg and is continuing on BiPAP. Patient currently speaking in full sentences. As per patient, he went to bed at 3 am and woke up at 4 am with acute onset of SOB and diaphoresis. Patient felt ok yesterday otherwise.  Denies fever, chills, chest pain, palpitation, orthopnea, PND, dizziness, lightheadedness, weakness, nausea, vomiting, diarrhea, constipation, abdominal pain, bladder and bowel problems, leg swelling, sick contact, recent travel.    ED vitals: T 97, HR 84, /108 (started nitro gtt), RR 36, SpO2 98%.   ED Course: WBC 12.48, BUN 30, creat 1.81, BNP 1810, Trop negative Lactate 6.1--> 3.3, PH 7.07--> 7.14, CXR showed pulmonary edema  Outpatient ECHO: 9/5/2017: Mod dil LV, akinetic distal septum, apex akinetic, lateral wall hypokinesis, EF 25-30%, mild MR, mild TR    Allergies  iodine (Unknown)    MEDICATIONS    nitroglycerin  Infusion 100 MICROgram(s)/Min IV Continuous <Continuous    HOME MEDICATIONS    1. Glipizide 5 mg PO daily  2. Coreg 12.5 mg PO BID  3. Lasix 20 mg PO daily  4. Gabapentin 300 mg PO HS  5. Lipitor 40 mg PO daily  6. Plavix 75 mg PO daily  7. Coumadin 5 mg PO 4 x week and 7.5 mg PO 3 x week  8. Kril Oil    PAST MEDICAL & SURGICAL HISTORY:  CHF (congestive heart failure)  CAD (coronary artery disease)  Artificial cardiac pacemaker    FAMILY HISTORY:   Heart disease for mother and father    SUBSTANCE USE  Tobacco Usage:  ( ) never smoked   (x ) former smoker  ( ) current smoker; Packs per day:   Alcohol Usage: (x ) none  ( ) occasional ( ) 2-3 times a week ( ) daily; Last drink:   Recreational drugs ( x) None    REVIEW OF SYSTEMS:  CONSTITUTIONAL: No fevers, No chills, No fatigue, No weight gain  EYES: No vision changes   ENT: No congestion, No ear pain, No sore throat.  NECK: No pain, No stiffness  RESPIRATORY: + shortness of breath, No cough, No wheezing, No hemoptysis  CARDIOVASCULAR: No chest pain. No palpitations, No ALBERTO, No orthopnea, No paroxysmal nocturnal dyspnea, No pleuritic pain  GASTROINTESTINAL: No abdominal pain, No nausea, No vomiting, No hematemesis, No diarrhea No constipation. No melena  GENITOURINARY: No dysuria, No frequency, No incontinence, No hematuria  NEUROLOGICAL: No dizziness, No lightheadedness, No syncope, No LOC, No headache, No numbness or weakness  MUSCULOSKELETAL: No joint pain, No joint swelling.  PSYCHIATRIC: No anxiety, No depression  SKIN: + diaphoresis. No itching, No rashes, No pressure ulcers    All other review of systems is negative unless indicated above.    VITAL SIGNS  T(C): 36.1 (04-26-18 @ 05:04), Max: 36.1 (04-26-18 @ 05:04)  HR: 80 (04-26-18 @ 06:27) (77 - 92)  BP: 131/86 (04-26-18 @ 06:27) (120/79 - 172/108)  RR: 24 (04-26-18 @ 06:27) (23 - 36)  SpO2: 100% (04-26-18 @ 06:27) (98% - 100%)  Wt(kg): --    PHYSICAL EXAM:  Appearance: NAD, mild resp distress  HEENT:   Normal oral mucosa, PERRL, EOMI  Cardiovascular: Regular rate and rhythm, Normal S1 S2, No JVD, No murmurs, No edema  Respiratory: Crackes mid way down, No rhonchi, No wheezing. No tenderness to palpation  Gastrointestinal:  Soft, Non-tender, + BS  Neurologic: Non-focal, A&Ox3  Skin: Warm and dry, No rashes, No ecchymoses, No cyanosis  Extremities: No clubbing, cyanosis or edema. Cold hands and feet  Vascular: Peripheral pulses palpable 2+ bilaterally  Psychiatry: Mood & affect appropriate    LABORATORY VALUES	 	                        14.2   12.48 )-----------( 188      ( 26 Apr 2018 04:30 )             45.8     04-26    139  |  100  |  30<H>  ----------------------------<  377<H>  5.0   |  16<L>  |  1.81<H>    Ca    9.1      26 Apr 2018 04:30    TPro  8.1  /  Alb  4.3  /  TBili  0.4  /  DBili  x   /  AST  30  /  ALT  24  /  AlkPhos  104  04-26  LIVER FUNCTIONS - ( 26 Apr 2018 04:30 )  Alb: 4.3 g/dL / Pro: 8.1 g/dL / ALK PHOS: 104 u/L / ALT: 24 u/L / AST: 30 u/L / GGT: x         Prothrombin Time, Plasma: 20.0 SEC (04-26 @ 04:30)    CARDIAC MARKERS:  Troponin T, Serum: < 0.06 ng/mL (04-26 @ 04:30)  Serum Pro-Brain Natriuretic Peptide: 1810 pg/mL (04-26 @ 04:30)  Blood Gas Venous - Lactate: 3.3 mmol/L (04-26 @ 05:42)  Blood Gas Venous - Lactate: 6.1 mmol/L (04-26 @ 04:30)    TELEMETRY: 	    ECG:  	  RADIOLOGY:  OTHER: 	    PREVIOUS DIAGNOSTIC TESTING:    [ ] Echocardiogram:  [ ] Catheterization:  [ ] Stress Test:  	    ASSESSMENT AND PLAN  57M PMH CAD, CHF (EF 25%), PPM, DM 2, HLD, Afib (On Coumadin), CKD, presents to the ED in acute respiratory distress found to have pulmonary edema, currently on nitro gtt and BiPAP.     Outpatient ECHO: 9/5/2017: Mod dil LV, akinetic distal septum, apex akinetic, lateral wall hypokinesis, EF 25-30%, mild MR, mild TR      PLAN  	  Patient p/w SOB. Has h/o CHF and is on Lasix 20 mg PO daily at home.   CXR showed pulmonary edema. Patient has elevated lactate with lactic acidosis  Admit to CCU.   Continuous cardiac monitoring to r/o arrhythmias.   Continue Bipap, wean as tolerated  Check ABG, Check pro BNP, Trend lactate, Trend CE  Diuresis: Increase Lasix to 40 mg IVP BID  Wean nitro gtt as per BP  Trend BMP 2/2 diuresis and replete as needed  Daily weight monitoring  Strict I/O  Low sodium DASH diet  Check ECHO to evaluate LV/RV function and valvular abnormalities  Continue ACE as tolerated  PPM interrogation if indicated  Continue current medications  Continue coumadin    # [id number]

## 2018-04-26 NOTE — H&P ADULT - PROBLEM SELECTOR PLAN 6
- on alternating coumadin 5mg/7.5mg doses.   - digoxin level 1.0. Restarted on home digoxin dose.   - will dose with 5mg tonight and check INR in AM daily.

## 2018-04-26 NOTE — ED ADULT NURSE REASSESSMENT NOTE - NS ED NURSE REASSESS COMMENT FT1
BP consistently @ 120 systolic, Nitro drip reduced to 60mcg - see flowsheet. Pt states "feeling much better."  Bi Pap in place.

## 2018-04-26 NOTE — H&P ADULT - ATTENDING COMMENTS
Agree with above assessment and plan  Acute on chronic systolic heart failure  On Bipap  Follow CMP  Cont coreg at 3.125 mg bid  Cont isordil 10 mg TID  Uptitrate as needed

## 2018-04-26 NOTE — H&P ADULT - PROBLEM SELECTOR PLAN 3
- mixed picture 2/2 hypercapnia and lactic acidosis from demand ischemia.   - unlikely DKA as BHB negative.   - will check repeat VBG, lactate, BMP for resolution.   - management as above.

## 2018-04-26 NOTE — H&P ADULT - ASSESSMENT
57M Hc of CAD, CHF (EF 25%) s/p AICD, DM 2, HLD, Afib (On Coumadin), CKD, admitted for acute hypoxic 57M Hx of CAD, CHF (EF 25%) s/p AICD, DM 2, HLD, Afib (On Coumadin), CKD, admitted for acute hypoxic hypercapnic RF 2/2 ADHF.

## 2018-04-26 NOTE — H&P ADULT - PROBLEM SELECTOR PLAN 7
- on glipizide at home. No insulin.   - started on low scale SSI. Will start on basal and premeal doses based on 24hr requirements.   - BMP glucose elevated in 300s and patient acidotic but BHB wnl.

## 2018-04-26 NOTE — H&P ADULT - PMH
Atrial fibrillation    CAD (coronary artery disease)    CHF (congestive heart failure)    CKD (chronic kidney disease)    DM2 (diabetes mellitus, type 2)    HLD (hyperlipidemia)

## 2018-04-26 NOTE — ED PROVIDER NOTE - PHYSICAL EXAMINATION
ATTENDING PHYSICAL EXAM DR. ARREOLA ***GEN - severe distress A+O x3 ***HEAD - NC/AT ***EYES/NOSE - PERRL, EOMI, mucous membranes moist, no discharge ***THROAT: Oral cavity and pharynx normal. No inflammation, swelling, exudate, or lesions.  ***NECK: Neck supple, non-tender without lymphadenopathy, no masses, no thyromegaly.   ***PULMONARY - coarse b/l breath sounds. ***CARDIAC -s1s2, irreulgarly irregular, no M,G,R  ***ABDOMEN - +BS, ND, NT, soft, no guarding, no rebound, no masses   ***BACK - no CVA tenderness, Normal  spine ***EXTREMITIES - symmetric pulses, 2+ dp, capillary refill < 2 seconds, no clubbing, no cyanosis, no edema ***SKIN - no rash or bruising   ***NEUROLOGIC - alert

## 2018-04-26 NOTE — H&P ADULT - PROBLEM SELECTOR PLAN 1
- 2/2 ADHF. Unclear what precipitated events, but patient may have been underdosing PO lasix.   - IV lasix 40 BID.  - Strict I/O's - 2/2 ADHF. Unclear what precipitated events, but patient may have been underdosing PO lasix.   - IV lasix 40 BID.  - Isodril 10 TID.   - will check repeat VBG and wean off BiPAP as tolerated.   - Strict I/O's. - 2/2 ADHF. Unclear what precipitated events, but patient may have been underdosing PO lasix.   - IV lasix 40 BID.  - Isodril 10 TID. Nitro gtt discontinued.   - will check repeat VBG and wean off BiPAP as tolerated.   - Strict I/O's.

## 2018-04-26 NOTE — ED PROVIDER NOTE - PROGRESS NOTE DETAILS
ATTG NOTE DR. ARREOLA pt improved, but still on 90mcg/min nitro, CCU called again for consultation  and confirmed to see patient within 15min.

## 2018-04-26 NOTE — ED PROVIDER NOTE - MEDICAL DECISION MAKING DETAILS
Severe CHF exacerbation - APE  1) IV access/LABS/pro BNP/EKG/cardiac monitor/ASA/IV Lasix/Nitro/BiPap  2) CXR  3) strict Is Os, montez as patient unable to measure urine output  4) CCU eval

## 2018-04-26 NOTE — H&P ADULT - PROBLEM SELECTOR PLAN 8
- Diet: consistent carbohydrate, DASH/TLC.   - DVT ppx:  - Dispo: home. No skilled PT needs.     Morgan Hanks MD   PGY1  Pager: [phone]

## 2018-04-26 NOTE — ED ADULT NURSE NOTE - OBJECTIVE STATEMENT
Pt received in trauma C, drowsy, lethargic, answering yes or no questions with nods but not verbalizing as of yet.  Pt received from EMS with CPAP in place, 20g IVL to R hand in place, received 3 tabs SL nitro.  NSR on monitor.  Tachypneic, SOB, diaphoretic. Pt placed on BIPAP. 2nd IVL placed to L wrist, 20g.  Pt placed on nitro drip as per MD orders.  EKG completed. Labs sent.

## 2018-04-26 NOTE — H&P ADULT - HISTORY OF PRESENT ILLNESS
57M PMH CAD, CHF (EF 25%), PPM, DM 2, HLD, Afib (On Coumadin), CKD, presents to the ED in acute respiratory distress, unable to speak full sentences as per EMS and hypoxic to 60%, started on CPAP 10 in the field.  In ER, patient was started BiPAP. BP 170s. Nitro gtt started at 100 mcg in ER. BP currently 140s. Lasix 40 mg IVP given x 1. Patient feeling better, currently on nitro 75 mcg and is continuing on BiPAP. Patient currently speaking in full sentences. As per patient, he went to bed at 3 am and woke up at 4 am with acute onset of SOB and diaphoresis. Patient felt ok yesterday otherwise.  Denies fever, chills, chest pain, palpitation, orthopnea, PND, dizziness, lightheadedness, weakness, nausea, vomiting, diarrhea, constipation, abdominal pain, bladder and bowel problems, leg swelling, sick contact, recent travel.    ED vitals: T 97, HR 84, /108 (started nitro gtt), RR 36, SpO2 98%.   ED Course: WBC 12.48, BUN 30, creat 1.81, BNP 1810, Trop negative Lactate 6.1--> 3.3, PH 7.07--> 7.14, CXR showed pulmonary edema  Outpatient ECHO: 9/5/2017: Mod dil LV, akinetic distal septum, apex akinetic, lateral wall hypokinesis, EF 25-30%, mild MR, mild TR 57M PMH CAD, CHF (EF 25%) s/p AICD, PPM, DM 2, HLD, Afib (On Coumadin), CKD, presents to the ED in acute respiratory distress, unable to speak full sentences as per EMS and hypoxic to 60%, started on CPAP 10 in the field.  In ER, patient was started BiPAP. BP 170s. Nitro gtt started at 100 mcg in ER. BP currently 140s. Lasix 40 mg IVP given x 1. Patient feeling better, currently on nitro 75 mcg and is continuing on BiPAP. Patient currently speaking in full sentences. As per patient, he went to bed at 3 am and woke up at 4 am with acute onset of SOB and diaphoresis. Patient felt ok yesterday otherwise.  Denies fever, chills, chest pain, palpitation, orthopnea, PND, dizziness, lightheadedness, weakness, nausea, vomiting, diarrhea, constipation, abdominal pain, bladder and bowel problems, leg swelling, sick contact, recent travel.    ED vitals: T 97, HR 84, /108 (started nitro gtt), RR 36, SpO2 98%.   ED Course: WBC 12.48, BUN 30, creat 1.81, BNP 1810, Trop negative Lactate 6.1--> 3.3, PH 7.07--> 7.14, CXR showed pulmonary edema  Outpatient ECHO: 9/5/2017: Mod dil LV, akinetic distal septum, apex akinetic, lateral wall hypokinesis, EF 25-30%, mild MR, mild TR 57M Hx of CAD, CHF (EF 25%) s/p AICD, PPM, DM 2, HLD, Afib (on Coumadin, digoxin), CKD 3 (baseline 1.6), presented with sudden-onset dyspnea at home. Patient had been visiting family in [place] from [place] and had been sitting on the couch at rest when he suddenly developed dyspnea and diaphoresis (no CP, palpitations). Family called EMS and patient was found to be hypoxic to 60%, started on CPAP 10 in the field. Denied recent sick contacts or any other recent travel. Patient states that he has been adherent with all medications, including his lasix regimen (although, on outpatient records, patient was directed to take 20PO daily and he had only been taking 4 times a week). Of note, outpatient TTE from 9/5/2017 showed Mod dil LV, akinetic distal septum, apex akinetic, lateral wall hypokinesis, EF 25-30%, mild MR, mild TR.      In ED, VS significant for systolic BP with systolic in 170s, RR36, SpO2 improved to 98% on BiPAP. Patient started on BiPAP. Nitro gtt started at 100 mcg. Lasix 40 mg IVP given x 1. Labs notable for elevated Cr from baseline 1.81 (baseline 1.6 per outpatient records from patient), BNP 1810, Trop negative, Lactate 6.1, VBG PH 7.07, CXR showed pulmonary edema.

## 2018-04-26 NOTE — H&P ADULT - PROBLEM SELECTOR PLAN 2
- home carvedilol dose decreased to 6.25 BID.   - - Hx of HFpEF of 25-30%, s/p AICD.   - home carvedilol dose decreased to 6.25 BID.   - IV lasix 40 BID.

## 2018-04-26 NOTE — ED PROVIDER NOTE - CRITICAL CARE PROVIDED
consultation with other physicians/documentation/consult w/ pt's family directly relating to pts condition/direct patient care (not related to procedure)/additional history taking

## 2018-04-27 LAB
ALBUMIN SERPL ELPH-MCNC: 4 G/DL — SIGNIFICANT CHANGE UP (ref 3.3–5)
ALP SERPL-CCNC: 68 U/L — SIGNIFICANT CHANGE UP (ref 40–120)
ALT FLD-CCNC: 18 U/L — SIGNIFICANT CHANGE UP (ref 4–41)
APTT BLD: 43.5 SEC — HIGH (ref 27.5–37.4)
AST SERPL-CCNC: 16 U/L — SIGNIFICANT CHANGE UP (ref 4–40)
BILIRUB SERPL-MCNC: 0.5 MG/DL — SIGNIFICANT CHANGE UP (ref 0.2–1.2)
BUN SERPL-MCNC: 34 MG/DL — HIGH (ref 7–23)
CALCIUM SERPL-MCNC: 8.9 MG/DL — SIGNIFICANT CHANGE UP (ref 8.4–10.5)
CHLORIDE SERPL-SCNC: 102 MMOL/L — SIGNIFICANT CHANGE UP (ref 98–107)
CK MB BLD-MCNC: 5.59 NG/ML — SIGNIFICANT CHANGE UP (ref 1–6.6)
CK MB BLD-MCNC: 7.17 NG/ML — HIGH (ref 1–6.6)
CK SERPL-CCNC: 187 U/L — SIGNIFICANT CHANGE UP (ref 30–200)
CK SERPL-CCNC: 202 U/L — HIGH (ref 30–200)
CO2 SERPL-SCNC: 24 MMOL/L — SIGNIFICANT CHANGE UP (ref 22–31)
CREAT SERPL-MCNC: 1.9 MG/DL — HIGH (ref 0.5–1.3)
GLUCOSE BLDC GLUCOMTR-MCNC: 133 MG/DL — HIGH (ref 70–99)
GLUCOSE BLDC GLUCOMTR-MCNC: 139 MG/DL — HIGH (ref 70–99)
GLUCOSE BLDC GLUCOMTR-MCNC: 191 MG/DL — HIGH (ref 70–99)
GLUCOSE BLDC GLUCOMTR-MCNC: 342 MG/DL — HIGH (ref 70–99)
GLUCOSE SERPL-MCNC: 124 MG/DL — HIGH (ref 70–99)
HBA1C BLD-MCNC: 6.7 % — HIGH (ref 4–5.6)
HCT VFR BLD CALC: 38.7 % — LOW (ref 39–50)
HGB BLD-MCNC: 12.4 G/DL — LOW (ref 13–17)
INR BLD: 2.01 — HIGH (ref 0.88–1.17)
MAGNESIUM SERPL-MCNC: 1.8 MG/DL — SIGNIFICANT CHANGE UP (ref 1.6–2.6)
MCHC RBC-ENTMCNC: 28.9 PG — SIGNIFICANT CHANGE UP (ref 27–34)
MCHC RBC-ENTMCNC: 32 % — SIGNIFICANT CHANGE UP (ref 32–36)
MCV RBC AUTO: 90.2 FL — SIGNIFICANT CHANGE UP (ref 80–100)
NRBC # FLD: 0 — SIGNIFICANT CHANGE UP
PHOSPHATE SERPL-MCNC: 4.3 MG/DL — SIGNIFICANT CHANGE UP (ref 2.5–4.5)
PLATELET # BLD AUTO: 166 K/UL — SIGNIFICANT CHANGE UP (ref 150–400)
PMV BLD: 11.7 FL — SIGNIFICANT CHANGE UP (ref 7–13)
POTASSIUM SERPL-MCNC: 3.6 MMOL/L — SIGNIFICANT CHANGE UP (ref 3.5–5.3)
POTASSIUM SERPL-SCNC: 3.6 MMOL/L — SIGNIFICANT CHANGE UP (ref 3.5–5.3)
PROT SERPL-MCNC: 7.1 G/DL — SIGNIFICANT CHANGE UP (ref 6–8.3)
PROTHROM AB SERPL-ACNC: 23.4 SEC — HIGH (ref 9.8–13.1)
RBC # BLD: 4.29 M/UL — SIGNIFICANT CHANGE UP (ref 4.2–5.8)
RBC # FLD: 13.7 % — SIGNIFICANT CHANGE UP (ref 10.3–14.5)
SODIUM SERPL-SCNC: 142 MMOL/L — SIGNIFICANT CHANGE UP (ref 135–145)
TROPONIN T SERPL-MCNC: 0.19 NG/ML — HIGH (ref 0–0.06)
TROPONIN T SERPL-MCNC: 0.2 NG/ML — HIGH (ref 0–0.06)
WBC # BLD: 8.22 K/UL — SIGNIFICANT CHANGE UP (ref 3.8–10.5)
WBC # FLD AUTO: 8.22 K/UL — SIGNIFICANT CHANGE UP (ref 3.8–10.5)

## 2018-04-27 PROCEDURE — 93306 TTE W/DOPPLER COMPLETE: CPT | Mod: 26

## 2018-04-27 PROCEDURE — 99233 SBSQ HOSP IP/OBS HIGH 50: CPT

## 2018-04-27 RX ORDER — CHLORHEXIDINE GLUCONATE 213 G/1000ML
1 SOLUTION TOPICAL
Qty: 0 | Refills: 0 | Status: DISCONTINUED | OUTPATIENT
Start: 2018-04-27 | End: 2018-04-29

## 2018-04-27 RX ORDER — MAGNESIUM SULFATE 500 MG/ML
2 VIAL (ML) INJECTION ONCE
Qty: 0 | Refills: 0 | Status: COMPLETED | OUTPATIENT
Start: 2018-04-27 | End: 2018-04-27

## 2018-04-27 RX ORDER — WARFARIN SODIUM 2.5 MG/1
6 TABLET ORAL ONCE
Qty: 0 | Refills: 0 | Status: COMPLETED | OUTPATIENT
Start: 2018-04-27 | End: 2018-04-27

## 2018-04-27 RX ORDER — WARFARIN SODIUM 2.5 MG/1
5 TABLET ORAL ONCE
Qty: 0 | Refills: 0 | Status: DISCONTINUED | OUTPATIENT
Start: 2018-04-27 | End: 2018-04-27

## 2018-04-27 RX ORDER — POTASSIUM CHLORIDE 20 MEQ
40 PACKET (EA) ORAL ONCE
Qty: 0 | Refills: 0 | Status: COMPLETED | OUTPATIENT
Start: 2018-04-27 | End: 2018-04-27

## 2018-04-27 RX ADMIN — Medication 2: at 22:41

## 2018-04-27 RX ADMIN — CHLORHEXIDINE GLUCONATE 1 APPLICATION(S): 213 SOLUTION TOPICAL at 13:21

## 2018-04-27 RX ADMIN — Medication 0.12 MILLIGRAM(S): at 06:27

## 2018-04-27 RX ADMIN — Medication 40 MILLIEQUIVALENT(S): at 06:26

## 2018-04-27 RX ADMIN — ATORVASTATIN CALCIUM 40 MILLIGRAM(S): 80 TABLET, FILM COATED ORAL at 22:39

## 2018-04-27 RX ADMIN — Medication 1: at 12:16

## 2018-04-27 RX ADMIN — CARVEDILOL PHOSPHATE 3.12 MILLIGRAM(S): 80 CAPSULE, EXTENDED RELEASE ORAL at 17:47

## 2018-04-27 RX ADMIN — Medication 81 MILLIGRAM(S): at 12:16

## 2018-04-27 RX ADMIN — GABAPENTIN 300 MILLIGRAM(S): 400 CAPSULE ORAL at 22:39

## 2018-04-27 RX ADMIN — ISOSORBIDE DINITRATE 10 MILLIGRAM(S): 5 TABLET ORAL at 06:27

## 2018-04-27 RX ADMIN — ISOSORBIDE DINITRATE 10 MILLIGRAM(S): 5 TABLET ORAL at 13:20

## 2018-04-27 RX ADMIN — Medication 50 GRAM(S): at 06:27

## 2018-04-27 RX ADMIN — Medication 40 MILLIGRAM(S): at 06:27

## 2018-04-27 RX ADMIN — CARVEDILOL PHOSPHATE 3.12 MILLIGRAM(S): 80 CAPSULE, EXTENDED RELEASE ORAL at 06:27

## 2018-04-27 RX ADMIN — Medication 40 MILLIGRAM(S): at 17:47

## 2018-04-27 RX ADMIN — WARFARIN SODIUM 6 MILLIGRAM(S): 2.5 TABLET ORAL at 17:47

## 2018-04-27 RX ADMIN — CLOPIDOGREL BISULFATE 75 MILLIGRAM(S): 75 TABLET, FILM COATED ORAL at 12:16

## 2018-04-27 NOTE — PROGRESS NOTE ADULT - PROBLEM SELECTOR PLAN 3
- mixed picture 2/2 hypercapnia and lactic acidosis from demand ischemia.   - unlikely DKA as BHB negative.   - will check repeat VBG, lactate, BMP for resolution.   - management as above. - mixed picture 2/2 hypercapnia and lactic acidosis from demand ischemia.   - unlikely DKA as BHB negative.   - resolving on repeat VBG, BMP.   - management as above.

## 2018-04-27 NOTE — PROGRESS NOTE ADULT - PROBLEM SELECTOR PLAN 7
- on glipizide at home. No insulin.   - started on low scale SSI. Will start on basal and premeal doses based on 24hr requirements.   - BMP glucose elevated in 300s and patient acidotic but BHB wnl. - on glipizide at home. No insulin.   - started on low scale SSI. Will start on basal and premeal doses based on 24hr requirements.   - BMP glucose elevated in 300s on presentation and patient acidotic but BHB wnl. Unlikely in DKA or HHS. Will continue to monitor via BMP.

## 2018-04-27 NOTE — PROGRESS NOTE ADULT - PROBLEM SELECTOR PLAN 1
- 2/2 ADHF. Unclear what precipitated events, but patient may have been underdosing PO lasix.   - IV lasix 40 BID.  - Isodril 10 TID. Nitro gtt discontinued.   - will check repeat VBG and wean off BiPAP as tolerated.   - Strict I/O's. - 2/2 ADHF. Now off BiPAP.  - Now with CP and elevated cardiac enzymes concerning for ischemic event precipitating ADHF.   - will trend CE for second set. If elevated, will consider LHC.   - will f/u TTE results this AM.   - IV lasix 40 BID.  - Isodril 10 TID.   - Strict I/O's.

## 2018-04-27 NOTE — PROGRESS NOTE ADULT - ASSESSMENT
57M Hx of CAD, CHF (EF 25%) s/p AICD, DM 2, HLD, Afib (On Coumadin), CKD, admitted for acute hypoxic hypercapnic RF 2/2 ADHF. 57M Hx of CAD, CHF (EF 25%) s/p AICD, DM 2, HLD, Afib (On Coumadin), CKD, admitted for acute hypoxic hypercapnic RF 2/2 ADHF. Now with CP and elevated cardiac enzymes concerning for ischemic event precipitating ADHF.

## 2018-04-27 NOTE — PROGRESS NOTE ADULT - PROBLEM SELECTOR PLAN 4
- baseline Cr 1.6. Now elevated to 1.8.   - likely 2/2 congestion or poor forward flow from ADHF. - baseline Cr 1.6. Cr uptrending to 1.9 this AM.   - likely 2/2 congestion or poor forward flow from ADHF.  - management as above for ADHF.

## 2018-04-27 NOTE — PROGRESS NOTE ADULT - SUBJECTIVE AND OBJECTIVE BOX
ABHISHEK GIANG  57y  Male    Patient is a 57y old  Male who presents with a chief complaint of "He could not breath." (26 Apr 2018 09:23)      INTERVAL HPI/OVERNIGHT EVENTS:    REVIEW OF SYSTEMS:  CONSTITUTIONAL: No fever, weight loss, or fatigue  EYES: No eye pain, visual disturbances, or discharge  ENMT:  No difficulty hearing, tinnitus, vertigo; No sinus or throat pain  NECK: No pain or stiffness  BREASTS: No pain, masses, or nipple discharge  RESPIRATORY: No cough, wheezing, chills or hemoptysis; No shortness of breath  CARDIOVASCULAR: No chest pain, palpitations, dizziness, or leg swelling  GASTROINTESTINAL: No abdominal or epigastric pain. No nausea, vomiting, or hematemesis; No diarrhea or constipation. No melena or hematochezia.  GENITOURINARY: No dysuria, frequency, hematuria, or incontinence  NEUROLOGICAL: No headaches, memory loss, loss of strength, numbness, or tremors  SKIN: No itching, burning, rashes, or lesions   LYMPH NODES: No enlarged glands  ENDOCRINE: No heat or cold intolerance; No hair loss  MUSCULOSKELETAL: No joint pain or swelling; No muscle, back, or extremity pain  PSYCHIATRIC: No depression, anxiety, mood swings, or difficulty sleeping  HEME/LYMPH: No easy bruising, or bleeding gums  ALLERY AND IMMUNOLOGIC: No hives or eczema    T(C): 37.2 (04-27-18 @ 07:46), Max: 37.2 (04-27-18 @ 07:46)  HR: 85 (04-27-18 @ 08:06) (70 - 88)  BP: 131/96 (04-27-18 @ 08:00) (110/66 - 139/69)  RR: 23 (04-27-18 @ 08:06) (14 - 25)  SpO2: 97% (04-27-18 @ 08:06) (90% - 100%)  Wt(kg): --Vital Signs Last 24 Hrs  T(C): 37.2 (27 Apr 2018 07:46), Max: 37.2 (27 Apr 2018 07:46)  T(F): 99 (27 Apr 2018 07:46), Max: 99 (27 Apr 2018 07:46)  HR: 85 (27 Apr 2018 08:06) (70 - 88)  BP: 131/96 (27 Apr 2018 08:00) (110/66 - 139/69)  BP(mean): 105 (27 Apr 2018 08:00) (70 - 105)  RR: 23 (27 Apr 2018 08:06) (14 - 25)  SpO2: 97% (27 Apr 2018 08:06) (90% - 100%)    PHYSICAL EXAM:  GENERAL: NAD, well-groomed, well-developed  HEAD:  Atraumatic, Normocephalic  EYES: EOMI, PERRLA, conjunctiva and sclera clear  ENMT: No tonsillar erythema, exudates, or enlargement; Moist mucous membranes, Good dentition, No lesions  NECK: Supple, No JVD, Normal thyroid  NERVOUS SYSTEM:  Alert & Oriented X3, Good concentration; Motor Strength 5/5 B/L upper and lower extremities; DTRs 2+ intact and symmetric  CHEST/LUNG: Clear to percussion bilaterally; No rales, rhonchi, wheezing, or rubs  HEART: Regular rate and rhythm; No murmurs, rubs, or gallops  ABDOMEN: Soft, Nontender, Nondistended; Bowel sounds present  EXTREMITIES:  2+ Peripheral Pulses, No clubbing, cyanosis, or edema  LYMPH: No lymphadenopathy noted  SKIN: No rashes or lesions    Consultant(s) Notes Reviewed:  [x ] YES  [ ] NO  Care Discussed with Consultants/Other Providers [ x] YES  [ ] NO    LABS:                        12.4   8.22  )-----------( 166      ( 27 Apr 2018 04:45 )             38.7     04-27    142  |  102  |  34<H>  ----------------------------<  124<H>  3.6   |  24  |  1.90<H>    Ca    8.9      27 Apr 2018 04:45  Phos  4.3     04-27  Mg     1.8     04-27    TPro  7.1  /  Alb  4.0  /  TBili  0.5  /  DBili  x   /  AST  16  /  ALT  18  /  AlkPhos  68  04-27    PT/INR - ( 27 Apr 2018 04:45 )   PT: 23.4 SEC;   INR: 2.01          PTT - ( 27 Apr 2018 04:45 )  PTT:43.5 SEC    CAPILLARY BLOOD GLUCOSE      POCT Blood Glucose.: 133 mg/dL (27 Apr 2018 08:12)  POCT Blood Glucose.: 226 mg/dL (26 Apr 2018 22:36)  POCT Blood Glucose.: 178 mg/dL (26 Apr 2018 17:55)            RADIOLOGY & ADDITIONAL TESTS:    Imaging Personally Reviewed:  [ ] YES  [ ] NO    HEALTH ISSUES - PROBLEM Dx:  Acidosis: Acidosis  Need for prophylactic measure: Need for prophylactic measure  DM2 (diabetes mellitus, type 2): DM2 (diabetes mellitus, type 2)  Atrial fibrillation: Atrial fibrillation  CAD (coronary artery disease): CAD (coronary artery disease)  BRADFORD (acute kidney injury): BRADFORD (acute kidney injury)  CHF exacerbation: CHF exacerbation  Respiratory failure with hypoxia and hypercapnia: Respiratory failure with hypoxia and hypercapnia ABHISHEK GIANG  57y  Male    Patient is a 57y old  Male who presents with a chief complaint of "He could not breath." (26 Apr 2018 09:23)    INTERVAL HPI/OVERNIGHT EVENTS:  - c/o CP this AM. Repeat CE elevated this AM (neg on admission).   - denies dyspnea.     T(C): 37.2 (04-27-18 @ 07:46), Max: 37.2 (04-27-18 @ 07:46)  HR: 85 (04-27-18 @ 08:06) (70 - 88)  BP: 131/96 (04-27-18 @ 08:00) (110/66 - 139/69)  RR: 23 (04-27-18 @ 08:06) (14 - 25)  SpO2: 97% (04-27-18 @ 08:06) (90% - 100%)  Wt(kg): --Vital Signs Last 24 Hrs  T(C): 37.2 (27 Apr 2018 07:46), Max: 37.2 (27 Apr 2018 07:46)  T(F): 99 (27 Apr 2018 07:46), Max: 99 (27 Apr 2018 07:46)  HR: 85 (27 Apr 2018 08:06) (70 - 88)  BP: 131/96 (27 Apr 2018 08:00) (110/66 - 139/69)  BP(mean): 105 (27 Apr 2018 08:00) (70 - 105)  RR: 23 (27 Apr 2018 08:06) (14 - 25)  SpO2: 97% (27 Apr 2018 08:06) (90% - 100%)    PHYSICAL EXAM:  	Appearance: NAD	  	Cardiovascular: +systolic murmur at USB. Normal S1 S2. No JVD or peripheral edema appreciated.   	Respiratory: Lungs clear to auscultation.	  	Psychiatry: A & O x 3, Mood & affect appropriate  	Gastrointestinal:  soft nt nd, normoactive bs  	Skin: No rashes, No ecchymoses, No cyanosis	  	Neurologic: Non-focal  	Extremities: Normal range of motion, No clubbing, cyanosis    Vascular: Peripheral pulses palpable 2+ bilaterally    Consultant(s) Notes Reviewed:  [x ] YES  [ ] NO  Care Discussed with Consultants/Other Providers [ x] YES  [ ] NO    LABS:                        12.4   8.22  )-----------( 166      ( 27 Apr 2018 04:45 )             38.7     04-27    142  |  102  |  34<H>  ----------------------------<  124<H>  3.6   |  24  |  1.90<H>    Ca    8.9      27 Apr 2018 04:45  Phos  4.3     04-27  Mg     1.8     04-27    TPro  7.1  /  Alb  4.0  /  TBili  0.5  /  DBili  x   /  AST  16  /  ALT  18  /  AlkPhos  68  04-27    PT/INR - ( 27 Apr 2018 04:45 )   PT: 23.4 SEC;   INR: 2.01          PTT - ( 27 Apr 2018 04:45 )  PTT:43.5 SEC    CAPILLARY BLOOD GLUCOSE    POCT Blood Glucose.: 133 mg/dL (27 Apr 2018 08:12)  POCT Blood Glucose.: 226 mg/dL (26 Apr 2018 22:36)  POCT Blood Glucose.: 178 mg/dL (26 Apr 2018 17:55)    HEALTH ISSUES - PROBLEM Dx:  Acidosis: Acidosis  Need for prophylactic measure: Need for prophylactic measure  DM2 (diabetes mellitus, type 2): DM2 (diabetes mellitus, type 2)  Atrial fibrillation: Atrial fibrillation  CAD (coronary artery disease): CAD (coronary artery disease)  BRADFORD (acute kidney injury): BRADFORD (acute kidney injury)  CHF exacerbation: CHF exacerbation  Respiratory failure with hypoxia and hypercapnia: Respiratory failure with hypoxia and hypercapnia

## 2018-04-27 NOTE — PROGRESS NOTE ADULT - PROBLEM SELECTOR PLAN 2
- Hx of HFpEF of 25-30%, s/p AICD.   - home carvedilol dose decreased to 6.25 BID.   - IV lasix 40 BID. - Hx of HFpEF of 25-30%, s/p AICD.   - will f/u TTE results this AM.   - home carvedilol dose decreased to 6.25 BID.   - IV lasix 40 BID.

## 2018-04-27 NOTE — PROGRESS NOTE ADULT - ATTENDING COMMENTS
Patient seen and examined. Agree with above assessment and plan  Patient with known systolic heart failure- improved with diuresis  Will need to monitor CE to assess need for ischemic workup  Cont ASA and Plavix  Cont statin therapy

## 2018-04-27 NOTE — PROGRESS NOTE ADULT - PROBLEM SELECTOR PLAN 8
- Diet: consistent carbohydrate, DASH/TLC.   - DVT ppx:  - Dispo: home. No skilled PT needs.     Morgan Hanks MD   PGY1  Pager: [phone] - Diet: consistent carbohydrate, DASH/TLC.   - DVT ppx: on coumadin.   - Dispo: home. No skilled PT needs.     Morgan Hanks MD   PGY1  Pager: [phone]

## 2018-04-27 NOTE — PROGRESS NOTE ADULT - PROBLEM SELECTOR PLAN 6
- on alternating coumadin 5mg/7.5mg doses.   - digoxin level 1.0. Restarted on home digoxin dose.   - will dose with 5mg tonight and check INR in AM daily. - on alternating coumadin 5mg/7.5mg doses.   - digoxin level 1.0. Restarted on home digoxin dose.   - dosed with 5mg last night with INR in therapeutic range. However, pt may need to be started on heparin gtt today pending CE results.   - will redose with 5mg if no plans for heparin gtt.

## 2018-04-28 LAB
APPEARANCE UR: SIGNIFICANT CHANGE UP
APTT BLD: 40.2 SEC — HIGH (ref 27.5–37.4)
BILIRUB UR-MCNC: NEGATIVE — SIGNIFICANT CHANGE UP
BLOOD UR QL VISUAL: HIGH
BUN SERPL-MCNC: 40 MG/DL — HIGH (ref 7–23)
CALCIUM SERPL-MCNC: 9.4 MG/DL — SIGNIFICANT CHANGE UP (ref 8.4–10.5)
CHLORIDE SERPL-SCNC: 100 MMOL/L — SIGNIFICANT CHANGE UP (ref 98–107)
CK MB BLD-MCNC: 2.5 — SIGNIFICANT CHANGE UP (ref 0–2.5)
CK MB BLD-MCNC: 4.39 NG/ML — SIGNIFICANT CHANGE UP (ref 1–6.6)
CK SERPL-CCNC: 174 U/L — SIGNIFICANT CHANGE UP (ref 30–200)
CO2 SERPL-SCNC: 26 MMOL/L — SIGNIFICANT CHANGE UP (ref 22–31)
COLOR SPEC: SIGNIFICANT CHANGE UP
CREAT SERPL-MCNC: 1.77 MG/DL — HIGH (ref 0.5–1.3)
GLUCOSE BLDC GLUCOMTR-MCNC: 129 MG/DL — HIGH (ref 70–99)
GLUCOSE BLDC GLUCOMTR-MCNC: 159 MG/DL — HIGH (ref 70–99)
GLUCOSE BLDC GLUCOMTR-MCNC: 164 MG/DL — HIGH (ref 70–99)
GLUCOSE BLDC GLUCOMTR-MCNC: 207 MG/DL — HIGH (ref 70–99)
GLUCOSE SERPL-MCNC: 130 MG/DL — HIGH (ref 70–99)
GLUCOSE UR-MCNC: NEGATIVE — SIGNIFICANT CHANGE UP
HCT VFR BLD CALC: 41 % — SIGNIFICANT CHANGE UP (ref 39–50)
HGB BLD-MCNC: 13 G/DL — SIGNIFICANT CHANGE UP (ref 13–17)
HYALINE CASTS # UR AUTO: SIGNIFICANT CHANGE UP (ref 0–?)
INR BLD: 1.94 — HIGH (ref 0.88–1.17)
KETONES UR-MCNC: NEGATIVE — SIGNIFICANT CHANGE UP
LEUKOCYTE ESTERASE UR-ACNC: HIGH
MAGNESIUM SERPL-MCNC: 2.1 MG/DL — SIGNIFICANT CHANGE UP (ref 1.6–2.6)
MCHC RBC-ENTMCNC: 28.4 PG — SIGNIFICANT CHANGE UP (ref 27–34)
MCHC RBC-ENTMCNC: 31.7 % — LOW (ref 32–36)
MCV RBC AUTO: 89.7 FL — SIGNIFICANT CHANGE UP (ref 80–100)
MUCOUS THREADS # UR AUTO: SIGNIFICANT CHANGE UP
NITRITE UR-MCNC: NEGATIVE — SIGNIFICANT CHANGE UP
NON-SQ EPI CELLS # UR AUTO: <1 — SIGNIFICANT CHANGE UP
NRBC # FLD: 0 — SIGNIFICANT CHANGE UP
PH UR: 5.5 — SIGNIFICANT CHANGE UP (ref 4.6–8)
PHOSPHATE SERPL-MCNC: 4.2 MG/DL — SIGNIFICANT CHANGE UP (ref 2.5–4.5)
PLATELET # BLD AUTO: 168 K/UL — SIGNIFICANT CHANGE UP (ref 150–400)
PMV BLD: 11.7 FL — SIGNIFICANT CHANGE UP (ref 7–13)
POTASSIUM SERPL-MCNC: 3.6 MMOL/L — SIGNIFICANT CHANGE UP (ref 3.5–5.3)
POTASSIUM SERPL-SCNC: 3.6 MMOL/L — SIGNIFICANT CHANGE UP (ref 3.5–5.3)
PROT UR-MCNC: 20 MG/DL — SIGNIFICANT CHANGE UP
PROTHROM AB SERPL-ACNC: 21.8 SEC — HIGH (ref 9.8–13.1)
RBC # BLD: 4.57 M/UL — SIGNIFICANT CHANGE UP (ref 4.2–5.8)
RBC # FLD: 13.3 % — SIGNIFICANT CHANGE UP (ref 10.3–14.5)
RBC CASTS # UR COMP ASSIST: HIGH (ref 0–?)
SODIUM SERPL-SCNC: 141 MMOL/L — SIGNIFICANT CHANGE UP (ref 135–145)
SP GR SPEC: 1.02 — SIGNIFICANT CHANGE UP (ref 1–1.04)
SQUAMOUS # UR AUTO: SIGNIFICANT CHANGE UP
TROPONIN T SERPL-MCNC: 0.21 NG/ML — HIGH (ref 0–0.06)
UROBILINOGEN FLD QL: NORMAL MG/DL — SIGNIFICANT CHANGE UP
WBC # BLD: 10.52 K/UL — HIGH (ref 3.8–10.5)
WBC # FLD AUTO: 10.52 K/UL — HIGH (ref 3.8–10.5)
WBC CLUMPS #/AREA URNS HPF: PRESENT — HIGH (ref 0–?)
WBC UR QL: >50 — HIGH (ref 0–?)

## 2018-04-28 PROCEDURE — 99233 SBSQ HOSP IP/OBS HIGH 50: CPT

## 2018-04-28 RX ORDER — ACETAMINOPHEN 500 MG
650 TABLET ORAL ONCE
Qty: 0 | Refills: 0 | Status: COMPLETED | OUTPATIENT
Start: 2018-04-28 | End: 2018-04-28

## 2018-04-28 RX ORDER — WARFARIN SODIUM 2.5 MG/1
6 TABLET ORAL ONCE
Qty: 0 | Refills: 0 | Status: COMPLETED | OUTPATIENT
Start: 2018-04-28 | End: 2018-04-28

## 2018-04-28 RX ORDER — ISOSORBIDE MONONITRATE 60 MG/1
30 TABLET, EXTENDED RELEASE ORAL DAILY
Qty: 0 | Refills: 0 | Status: DISCONTINUED | OUTPATIENT
Start: 2018-04-28 | End: 2018-04-30

## 2018-04-28 RX ORDER — FUROSEMIDE 40 MG
40 TABLET ORAL EVERY 12 HOURS
Qty: 0 | Refills: 0 | Status: DISCONTINUED | OUTPATIENT
Start: 2018-04-28 | End: 2018-04-29

## 2018-04-28 RX ADMIN — ATORVASTATIN CALCIUM 40 MILLIGRAM(S): 80 TABLET, FILM COATED ORAL at 22:38

## 2018-04-28 RX ADMIN — CLOPIDOGREL BISULFATE 75 MILLIGRAM(S): 75 TABLET, FILM COATED ORAL at 11:59

## 2018-04-28 RX ADMIN — CARVEDILOL PHOSPHATE 3.12 MILLIGRAM(S): 80 CAPSULE, EXTENDED RELEASE ORAL at 05:57

## 2018-04-28 RX ADMIN — Medication 0.12 MILLIGRAM(S): at 05:57

## 2018-04-28 RX ADMIN — CARVEDILOL PHOSPHATE 3.12 MILLIGRAM(S): 80 CAPSULE, EXTENDED RELEASE ORAL at 18:05

## 2018-04-28 RX ADMIN — Medication 81 MILLIGRAM(S): at 11:59

## 2018-04-28 RX ADMIN — WARFARIN SODIUM 6 MILLIGRAM(S): 2.5 TABLET ORAL at 18:06

## 2018-04-28 RX ADMIN — CHLORHEXIDINE GLUCONATE 1 APPLICATION(S): 213 SOLUTION TOPICAL at 12:00

## 2018-04-28 RX ADMIN — Medication 650 MILLIGRAM(S): at 10:50

## 2018-04-28 RX ADMIN — Medication 1: at 17:57

## 2018-04-28 RX ADMIN — Medication 1: at 12:46

## 2018-04-28 RX ADMIN — GABAPENTIN 300 MILLIGRAM(S): 400 CAPSULE ORAL at 22:38

## 2018-04-28 RX ADMIN — Medication 650 MILLIGRAM(S): at 20:22

## 2018-04-28 RX ADMIN — Medication 40 MILLIGRAM(S): at 18:06

## 2018-04-28 RX ADMIN — ISOSORBIDE MONONITRATE 30 MILLIGRAM(S): 60 TABLET, EXTENDED RELEASE ORAL at 11:59

## 2018-04-28 RX ADMIN — ISOSORBIDE DINITRATE 10 MILLIGRAM(S): 5 TABLET ORAL at 05:57

## 2018-04-28 RX ADMIN — Medication 650 MILLIGRAM(S): at 11:45

## 2018-04-28 RX ADMIN — Medication 40 MILLIGRAM(S): at 05:57

## 2018-04-28 NOTE — DISCHARGE NOTE ADULT - NS AS ACTIVITY OBS
Walking-Indoors allowed/Stairs allowed/No Heavy lifting/straining/Bathing allowed/Showering allowed/Walking-Outdoors allowed

## 2018-04-28 NOTE — PROGRESS NOTE ADULT - PROBLEM SELECTOR PLAN 8
- Diet: consistent carbohydrate, DASH/TLC.   - DVT ppx: on coumadin.   - Dispo: home. No skilled PT needs.     Morgan Hanks MD   PGY1  Pager: [phone] - Diet: consistent carbohydrate, DASH/TLC.   - DVT ppx: on coumadin.   - Dispo: ischemic w/u. NST pending.    Richard Wheat MD  [facility] Internal Medicine   Pager # [phone]/ [phone]

## 2018-04-28 NOTE — DISCHARGE NOTE ADULT - MEDICATION SUMMARY - MEDICATIONS TO TAKE
I will START or STAY ON the medications listed below when I get home from the hospital:    Aspirin Low Dose 81 mg oral delayed release tablet  -- 1 tab(s) by mouth once a day  -- Indication: For CAD (coronary artery disease)    digoxin 125 mcg (0.125 mg) oral tablet  -- 1 tab(s) by mouth once a day  -- Indication: For Atrial fibrillation    Coumadin 5 mg oral tablet  -- 1.5 tabs by mouth M/W/F/[place]  1 tab by mouth T/Th/Sa  -- Indication: For Atrial fibrillation    Neurontin 300 mg oral capsule  -- 1 cap(s) by mouth once a day (at bedtime)  -- Indication: For Neuropathy    glipiZIDE 5 mg oral tablet  -- 1 tab(s) by mouth once a day  -- Indication: For Diabetes    atorvastatin 40 mg oral tablet  -- 1 tab(s) by mouth once a day  -- Indication: For HLD (hyperlipidemia)    clopidogrel 75 mg oral tablet  -- 1 tab(s) by mouth once a day  -- Indication: For CAD (coronary artery disease)    carvedilol 6.25 mg oral tablet  -- 1 tab(s) by mouth every 12 hours  -- Indication: For CHF (congestive heart failure)    Lasix 20 mg oral tablet  -- 1 tab(s) by mouth once a day  -- Indication: For CHF (congestive heart failure)    Cipro 250 mg oral tablet  -- 1 tab(s) by mouth every 12 hours   -- Avoid prolonged or excessive exposure to direct and/or artificial sunlight while taking this medication.  Check with your doctor before becoming pregnant.  Do not take dairy products, antacids, or iron preparations within one hour of this medication.  Finish all this medication unless otherwise directed by prescriber.  Medication should be taken with plenty of water.    -- Indication: For UTI (urinary tract infection)    hydrALAZINE 10 mg oral tablet  -- 1 tab(s) by mouth every 8 hours  -- Indication: For CHF (congestive heart failure)

## 2018-04-28 NOTE — PROGRESS NOTE ADULT - ASSESSMENT
57M Hx of CAD, CHF (EF 25%) s/p AICD, DM 2, HLD, Afib (On Coumadin), CKD, admitted for acute hypoxic hypercapnic RF 2/2 ADHF. Now with CP and elevated cardiac enzymes concerning for ischemic event precipitating ADHF. 57M Hx of CAD, CHF (EF 25%) s/p AICD, DM 2, HLD, Afib (On Coumadin), CKD, admitted for acute hypoxic hypercapnic RF 2/2 ADHF. Now with CP and elevated cardiac enzymes concerning for ischemic event precipitating ADHF, pending NST.

## 2018-04-28 NOTE — PROGRESS NOTE ADULT - PROBLEM SELECTOR PLAN 3
- mixed picture 2/2 hypercapnia and lactic acidosis from demand ischemia.   - unlikely DKA as BHB negative.   - resolving on repeat VBG, BMP.   - management as above.

## 2018-04-28 NOTE — PROGRESS NOTE ADULT - SUBJECTIVE AND OBJECTIVE BOX
HPI/INTERVAL HISTORY:  Patient seen and examined at bedside.    OBJECTIVE:  VITAL SIGNS:  ICU Vital Signs Last 24 Hrs  T(C): 36.8 (28 Apr 2018 08:00), Max: 37.1 (27 Apr 2018 11:54)  T(F): 98.2 (28 Apr 2018 08:00), Max: 98.8 (27 Apr 2018 11:54)  HR: 82 (28 Apr 2018 08:00) (70 - 90)  BP: 115/71 (28 Apr 2018 08:00) (88/61 - 136/73)  BP(mean): 82 (28 Apr 2018 08:00) (68 - 88)  ABP: --  ABP(mean): --  RR: 18 (28 Apr 2018 08:00) (10 - 24)  SpO2: 95% (28 Apr 2018 08:00) (93% - 100%)        04-27 @ 07:01  -  04-28 @ 07:00  --------------------------------------------------------  IN: 510 mL / OUT: 3275 mL / NET: -2765 mL    04-28 @ 07:01 - 04-28 @ 08:46  --------------------------------------------------------  IN: 0 mL / OUT: 600 mL / NET: -600 mL      CAPILLARY BLOOD GLUCOSE      POCT Blood Glucose.: 129 mg/dL (28 Apr 2018 08:29)      PHYSICAL EXAM:  Gen:   HEENT: NC/AT; PERRL, anicteric sclera  Neck: supple, no JVD  Resp: clear to ausculation B/L; no wheezes, rales or rhonchi  Cardiovasc: S1S2 normal; RRR; no murmurs, rubs or gallops  GI: soft, nondistended, nontender; +BS  Extr: warm, well-perfused, PT/DP pulses 2+ B/L; no LE edema  Skin: normal color and turgor  Neuro:     LABS:                        13.0   10.52 )-----------( 168      ( 28 Apr 2018 05:55 )             41.0     04-28    141  |  100  |  40<H>  ----------------------------<  130<H>  3.6   |  26  |  1.77<H>    Ca    9.4      28 Apr 2018 05:55  Phos  4.2     04-28  Mg     2.1     04-28    TPro  7.1  /  Alb  4.0  /  TBili  0.5  /  DBili  x   /  AST  16  /  ALT  18  /  AlkPhos  68  04-27    LIVER FUNCTIONS - ( 27 Apr 2018 04:45 )  Alb: 4.0 g/dL / Pro: 7.1 g/dL / ALK PHOS: 68 u/L / ALT: 18 u/L / AST: 16 u/L / GGT: x           PT/INR - ( 28 Apr 2018 05:55 )   PT: 21.8 SEC;   INR: 1.94          PTT - ( 28 Apr 2018 05:55 )  PTT:40.2 SEC    CARDIAC MARKERS ( 28 Apr 2018 05:55 )  x     / 0.21 ng/mL / 174 u/L / -- ng/mL / x      CARDIAC MARKERS ( 27 Apr 2018 11:45 )  x     / 0.20 ng/mL / 187 u/L / 5.59 ng/mL / x      CARDIAC MARKERS ( 27 Apr 2018 07:50 )  x     / 0.19 ng/mL / 202 u/L / 7.17 ng/mL / x            RADIOLOGY & ADDITIONAL TESTS: Reviewed.    aspirin enteric coated 81 milliGRAM(s) Oral daily  atorvastatin 40 milliGRAM(s) Oral at bedtime  carvedilol 3.125 milliGRAM(s) Oral every 12 hours  chlorhexidine 4% Liquid 1 Application(s) Topical <User Schedule>  clopidogrel Tablet 75 milliGRAM(s) Oral daily  dextrose 5%. 1000 milliLiter(s) IV Continuous <Continuous>  dextrose 50% Injectable 12.5 Gram(s) IV Push once  dextrose 50% Injectable 25 Gram(s) IV Push once  dextrose 50% Injectable 25 Gram(s) IV Push once  dextrose Gel 1 Dose(s) Oral once PRN  digoxin     Tablet 0.125 milliGRAM(s) Oral daily  furosemide   Injectable 40 milliGRAM(s) IV Push two times a day  gabapentin 300 milliGRAM(s) Oral at bedtime  glucagon  Injectable 1 milliGRAM(s) IntraMuscular once PRN  influenza   Vaccine 0.5 milliLiter(s) IntraMuscular once  insulin lispro (HumaLOG) corrective regimen sliding scale   SubCutaneous three times a day before meals  insulin lispro (HumaLOG) corrective regimen sliding scale   SubCutaneous at bedtime  isosorbide   dinitrate Tablet (ISORDIL) 10 milliGRAM(s) Oral three times a day      iodine (Unknown) HPI/INTERVAL HISTORY:  Patient seen and examined at bedside.    States he had left sided chest pain O/N 5 minutes at a time. Currently asymptomatic. Plan for NST    OBJECTIVE:  VITAL SIGNS:  ICU Vital Signs Last 24 Hrs  T(C): 36.8 (28 Apr 2018 08:00), Max: 37.1 (27 Apr 2018 11:54)  T(F): 98.2 (28 Apr 2018 08:00), Max: 98.8 (27 Apr 2018 11:54)  HR: 82 (28 Apr 2018 08:00) (70 - 90)  BP: 115/71 (28 Apr 2018 08:00) (88/61 - 136/73)  BP(mean): 82 (28 Apr 2018 08:00) (68 - 88)  ABP: --  ABP(mean): --  RR: 18 (28 Apr 2018 08:00) (10 - 24)  SpO2: 95% (28 Apr 2018 08:00) (93% - 100%)        04-27 @ 07:01 - 04-28 @ 07:00  --------------------------------------------------------  IN: 510 mL / OUT: 3275 mL / NET: -2765 mL    04-28 @ 07:01 - 04-28 @ 08:46  --------------------------------------------------------  IN: 0 mL / OUT: 600 mL / NET: -600 mL      CAPILLARY BLOOD GLUCOSE      POCT Blood Glucose.: 129 mg/dL (28 Apr 2018 08:29)      PHYSICAL EXAM:  Gen:   HEENT: NC/AT; PERRL, anicteric sclera  Neck: supple, no JVD  Resp: clear to ausculation B/L; no wheezes, rales or rhonchi  Cardiovasc: S1S2 normal; RRR; no murmurs, rubs or gallops  GI: soft, nondistended, nontender; +BS  Extr: warm, well-perfused, PT/DP pulses 2+ B/L; no LE edema  Skin: normal color and turgor  Neuro:     LABS:                        13.0   10.52 )-----------( 168      ( 28 Apr 2018 05:55 )             41.0     04-28    141  |  100  |  40<H>  ----------------------------<  130<H>  3.6   |  26  |  1.77<H>    Ca    9.4      28 Apr 2018 05:55  Phos  4.2     04-28  Mg     2.1     04-28    TPro  7.1  /  Alb  4.0  /  TBili  0.5  /  DBili  x   /  AST  16  /  ALT  18  /  AlkPhos  68  04-27    LIVER FUNCTIONS - ( 27 Apr 2018 04:45 )  Alb: 4.0 g/dL / Pro: 7.1 g/dL / ALK PHOS: 68 u/L / ALT: 18 u/L / AST: 16 u/L / GGT: x           PT/INR - ( 28 Apr 2018 05:55 )   PT: 21.8 SEC;   INR: 1.94          PTT - ( 28 Apr 2018 05:55 )  PTT:40.2 SEC    CARDIAC MARKERS ( 28 Apr 2018 05:55 )  x     / 0.21 ng/mL / 174 u/L / -- ng/mL / x      CARDIAC MARKERS ( 27 Apr 2018 11:45 )  x     / 0.20 ng/mL / 187 u/L / 5.59 ng/mL / x      CARDIAC MARKERS ( 27 Apr 2018 07:50 )  x     / 0.19 ng/mL / 202 u/L / 7.17 ng/mL / x            RADIOLOGY & ADDITIONAL TESTS: Reviewed.    aspirin enteric coated 81 milliGRAM(s) Oral daily  atorvastatin 40 milliGRAM(s) Oral at bedtime  carvedilol 3.125 milliGRAM(s) Oral every 12 hours  chlorhexidine 4% Liquid 1 Application(s) Topical <User Schedule>  clopidogrel Tablet 75 milliGRAM(s) Oral daily  dextrose 5%. 1000 milliLiter(s) IV Continuous <Continuous>  dextrose 50% Injectable 12.5 Gram(s) IV Push once  dextrose 50% Injectable 25 Gram(s) IV Push once  dextrose 50% Injectable 25 Gram(s) IV Push once  dextrose Gel 1 Dose(s) Oral once PRN  digoxin     Tablet 0.125 milliGRAM(s) Oral daily  furosemide   Injectable 40 milliGRAM(s) IV Push two times a day  gabapentin 300 milliGRAM(s) Oral at bedtime  glucagon  Injectable 1 milliGRAM(s) IntraMuscular once PRN  influenza   Vaccine 0.5 milliLiter(s) IntraMuscular once  insulin lispro (HumaLOG) corrective regimen sliding scale   SubCutaneous three times a day before meals  insulin lispro (HumaLOG) corrective regimen sliding scale   SubCutaneous at bedtime  isosorbide   dinitrate Tablet (ISORDIL) 10 milliGRAM(s) Oral three times a day      iodine (Unknown) HPI/INTERVAL HISTORY:  Patient seen and examined at bedside.    States he had left sided chest pain O/N 5 minutes at a time. Currently asymptomatic. Plan for NST    OBJECTIVE:  VITAL SIGNS:  ICU Vital Signs Last 24 Hrs  T(C): 36.8 (28 Apr 2018 08:00), Max: 37.1 (27 Apr 2018 11:54)  T(F): 98.2 (28 Apr 2018 08:00), Max: 98.8 (27 Apr 2018 11:54)  HR: 82 (28 Apr 2018 08:00) (70 - 90)  BP: 115/71 (28 Apr 2018 08:00) (88/61 - 136/73)  BP(mean): 82 (28 Apr 2018 08:00) (68 - 88)  ABP: --  ABP(mean): --  RR: 18 (28 Apr 2018 08:00) (10 - 24)  SpO2: 95% (28 Apr 2018 08:00) (93% - 100%)        04-27 @ 07:01 - 04-28 @ 07:00  --------------------------------------------------------  IN: 510 mL / OUT: 3275 mL / NET: -2765 mL    04-28 @ 07:01  -  04-28 @ 08:46  --------------------------------------------------------  IN: 0 mL / OUT: 600 mL / NET: -600 mL      CAPILLARY BLOOD GLUCOSE      POCT Blood Glucose.: 129 mg/dL (28 Apr 2018 08:29)      PHYSICAL EXAM:  Gen: NAD, sleeping on room entry.  HEENT: NC/AT; PERRL, anicteric sclera  Neck: supple, no JVD  Resp: clear to ausculation B/L; no wheezes, rales or rhonchi  Cardiovasc: S1S2 normal; RRR; no murmurs, rubs or gallops  GI: soft, nondistended, nontender; +BS  Extr: warm, well-perfused, PT/DP pulses 2+ B/L; no LE edema  Skin: normal color and turgor  Neuro: non-focal.    LABS:                        13.0   10.52 )-----------( 168      ( 28 Apr 2018 05:55 )             41.0     04-28    141  |  100  |  40<H>  ----------------------------<  130<H>  3.6   |  26  |  1.77<H>    Ca    9.4      28 Apr 2018 05:55  Phos  4.2     04-28  Mg     2.1     04-28    TPro  7.1  /  Alb  4.0  /  TBili  0.5  /  DBili  x   /  AST  16  /  ALT  18  /  AlkPhos  68  04-27    LIVER FUNCTIONS - ( 27 Apr 2018 04:45 )  Alb: 4.0 g/dL / Pro: 7.1 g/dL / ALK PHOS: 68 u/L / ALT: 18 u/L / AST: 16 u/L / GGT: x           PT/INR - ( 28 Apr 2018 05:55 )   PT: 21.8 SEC;   INR: 1.94          PTT - ( 28 Apr 2018 05:55 )  PTT:40.2 SEC    CARDIAC MARKERS ( 28 Apr 2018 05:55 )  x     / 0.21 ng/mL / 174 u/L / -- ng/mL / x      CARDIAC MARKERS ( 27 Apr 2018 11:45 )  x     / 0.20 ng/mL / 187 u/L / 5.59 ng/mL / x      CARDIAC MARKERS ( 27 Apr 2018 07:50 )  x     / 0.19 ng/mL / 202 u/L / 7.17 ng/mL / x            RADIOLOGY & ADDITIONAL TESTS: Reviewed.    aspirin enteric coated 81 milliGRAM(s) Oral daily  atorvastatin 40 milliGRAM(s) Oral at bedtime  carvedilol 3.125 milliGRAM(s) Oral every 12 hours  chlorhexidine 4% Liquid 1 Application(s) Topical <User Schedule>  clopidogrel Tablet 75 milliGRAM(s) Oral daily  dextrose 5%. 1000 milliLiter(s) IV Continuous <Continuous>  dextrose 50% Injectable 12.5 Gram(s) IV Push once  dextrose 50% Injectable 25 Gram(s) IV Push once  dextrose 50% Injectable 25 Gram(s) IV Push once  dextrose Gel 1 Dose(s) Oral once PRN  digoxin     Tablet 0.125 milliGRAM(s) Oral daily  furosemide   Injectable 40 milliGRAM(s) IV Push two times a day  gabapentin 300 milliGRAM(s) Oral at bedtime  glucagon  Injectable 1 milliGRAM(s) IntraMuscular once PRN  influenza   Vaccine 0.5 milliLiter(s) IntraMuscular once  insulin lispro (HumaLOG) corrective regimen sliding scale   SubCutaneous three times a day before meals  insulin lispro (HumaLOG) corrective regimen sliding scale   SubCutaneous at bedtime  isosorbide   dinitrate Tablet (ISORDIL) 10 milliGRAM(s) Oral three times a day      iodine (Unknown)

## 2018-04-28 NOTE — PROGRESS NOTE ADULT - PROBLEM SELECTOR PLAN 7
- on glipizide at home. No insulin.   - started on low scale SSI. Will start on basal and premeal doses based on 24hr requirements.   - BMP glucose elevated in 300s on presentation and patient acidotic but BHB wnl. Unlikely in DKA or HHS. Will continue to monitor via BMP. - on glipizide at home. No insulin.   - c/w LDISS. FSG well controlled monitor 24 hour requirements.

## 2018-04-28 NOTE — DISCHARGE NOTE ADULT - MEDICATION SUMMARY - MEDICATIONS TO CHANGE
I will SWITCH the dose or number of times a day I take the medications listed below when I get home from the hospital:    carvedilol 12.5 mg oral tablet  -- 1 tab(s) by mouth 2 times a day

## 2018-04-28 NOTE — DISCHARGE NOTE ADULT - CARE PLAN
Principal Discharge DX:	Acute on chronic systolic congestive heart failure  Goal:	remains euvolemic and without SOB  Assessment and plan of treatment:	take all meds as prescribed, follow DASH/LF diet, daily weights. Outpatient followup as instructed Principal Discharge DX:	Acute on chronic systolic congestive heart failure  Goal:	To relieve and prevent worsening symptoms associated with congestive heart failure, to improve quality of life, and to treat underlying conditions such as coronary heart disease, high blood pressure, or diabetes, and to maintain euvolemia.  Assessment and plan of treatment:	Low salt diet, fluid restriction to 1500 ml daily, monitor your fluid intake and weight daily, exercise as tolerated 30 minutes daily, and follow up with your physician within 1 to 2 weeks.  Secondary Diagnosis:	UTI (urinary tract infection)  Goal:	eradication of infection  Assessment and plan of treatment:	You were found to have a urinary tract infection. Please finish your course of antibiotics to complete a 10 day course. Please follow up with your PCP after.  Secondary Diagnosis:	Atrial fibrillation  Goal:	To restore or maintain a normal heart rate and rhythm, to prevent blood clots, and decrease the risks of stroke CVA/TIA.  Assessment and plan of treatment:	Please take your medications as prescribed.  Continue to take your blood thinner as prescribed and follow with your physician to monitor your levels.  Low fat diet, reduce caffeine intake, and exercise at least 30 minutes daily.  Secondary Diagnosis:	CAD (coronary artery disease)  Goal:	To be asymptomatic, to reduce risks factors such as hypertension, diabetes and hyperlipidemia to lower the risk of blood clots formation; and to prevent complications of coronary artery disease such as worsening chest pain, heart attack and death.  Assessment and plan of treatment:	Continue aspirin with the rest of your medication. Do not stop unless instructed by your physician.  Continue low salt, fat, cholesterol and carbohydrate diet. Follow up with cardiologist and primary care physician's routine appointment. Principal Discharge DX:	Acute on chronic systolic congestive heart failure  Goal:	To relieve and prevent worsening symptoms associated with congestive heart failure, to improve quality of life, and to treat underlying conditions such as coronary heart disease, high blood pressure, or diabetes, and to maintain euvolemia.  Assessment and plan of treatment:	Low salt diet, fluid restriction to 1500 ml daily, monitor your fluid intake and weight daily, exercise as tolerated 30 minutes daily, and follow up with your physician within 1 to 2 weeks.  Secondary Diagnosis:	UTI (urinary tract infection)  Goal:	eradication of infection  Assessment and plan of treatment:	You were found to have a urinary tract infection. Please finish your course of antibiotics to complete a 10 day course. Please follow up with your PCP after.  Secondary Diagnosis:	Atrial fibrillation  Goal:	To restore or maintain a normal heart rate and rhythm, to prevent blood clots, and decrease the risks of stroke CVA/TIA.  Assessment and plan of treatment:	Please take your medications as prescribed.  Continue to take your blood thinner as prescribed and follow with your physician to monitor your INR  levels in 2 days. Your INR today is low at 1.7.  Low fat diet, reduce caffeine intake, and exercise at least 30 minutes daily.  Secondary Diagnosis:	CAD (coronary artery disease)  Goal:	To be asymptomatic, to reduce risks factors such as hypertension, diabetes and hyperlipidemia to lower the risk of blood clots formation; and to prevent complications of coronary artery disease such as worsening chest pain, heart attack and death.  Assessment and plan of treatment:	Continue aspirin with the rest of your medication. Do not stop unless instructed by your physician.  Continue low salt, fat, cholesterol and carbohydrate diet. Follow up with cardiologist and primary care physician's routine appointment.

## 2018-04-28 NOTE — PROGRESS NOTE ADULT - PROBLEM SELECTOR PLAN 2
- Hx of HFpEF of 25-30%, s/p AICD.   - will f/u TTE results this AM.   - home carvedilol dose decreased to 6.25 BID.   - IV lasix 40 BID. - Hx of HFpEF of 25-30%, s/p AICD.   - Echo on 4/27 w severe global systolic dysfunction EF 20%.  - home carvedilol dose decreased to 6.25 BID.   - Decrease lasix to 40 PO bid.  - Plan for NST.

## 2018-04-28 NOTE — DISCHARGE NOTE ADULT - PLAN OF CARE
remains euvolemic and without SOB take all meds as prescribed, follow DASH/LF diet, daily weights. Outpatient followup as instructed To relieve and prevent worsening symptoms associated with congestive heart failure, to improve quality of life, and to treat underlying conditions such as coronary heart disease, high blood pressure, or diabetes, and to maintain euvolemia. Low salt diet, fluid restriction to 1500 ml daily, monitor your fluid intake and weight daily, exercise as tolerated 30 minutes daily, and follow up with your physician within 1 to 2 weeks. eradication of infection You were found to have a urinary tract infection. Please finish your course of antibiotics to complete a 10 day course. Please follow up with your PCP after. To restore or maintain a normal heart rate and rhythm, to prevent blood clots, and decrease the risks of stroke CVA/TIA. Please take your medications as prescribed.  Continue to take your blood thinner as prescribed and follow with your physician to monitor your levels.  Low fat diet, reduce caffeine intake, and exercise at least 30 minutes daily. To be asymptomatic, to reduce risks factors such as hypertension, diabetes and hyperlipidemia to lower the risk of blood clots formation; and to prevent complications of coronary artery disease such as worsening chest pain, heart attack and death. Continue aspirin with the rest of your medication. Do not stop unless instructed by your physician.  Continue low salt, fat, cholesterol and carbohydrate diet. Follow up with cardiologist and primary care physician's routine appointment. Please take your medications as prescribed.  Continue to take your blood thinner as prescribed and follow with your physician to monitor your INR  levels in 2 days. Your INR today is low at 1.7.  Low fat diet, reduce caffeine intake, and exercise at least 30 minutes daily.

## 2018-04-28 NOTE — DISCHARGE NOTE ADULT - PATIENT PORTAL LINK FT
You can access the [facility] Patient Portal, offered by [facility], by registering with the following website: [email or web address]

## 2018-04-28 NOTE — PROGRESS NOTE ADULT - PROBLEM SELECTOR PLAN 6
- on alternating coumadin 5mg/7.5mg doses.   - digoxin level 1.0. Restarted on home digoxin dose.   - dosed with 5mg last night with INR in therapeutic range. However, pt may need to be started on heparin gtt today pending CE results.   - will redose with 5mg if no plans for heparin gtt. - on alternating coumadin 5mg/7.5mg doses.   - digoxin level 1.0. Restarted on home digoxin dose.   - dosed with 6 mg last night with INR 1.94 this AM.  - dose 6 mg tonight.

## 2018-04-28 NOTE — PROGRESS NOTE ADULT - PROBLEM SELECTOR PLAN 1
- 2/2 ADHF. Now off BiPAP.  - Now with CP and elevated cardiac enzymes concerning for ischemic event precipitating ADHF.   - will trend CE for second set. If elevated, will consider LHC.   - will f/u TTE results this AM.   - IV lasix 40 BID.  - Isodril 10 TID.   - Strict I/O's.

## 2018-04-28 NOTE — PROGRESS NOTE ADULT - PROBLEM SELECTOR PLAN 4
- baseline Cr 1.6. Cr uptrending to 1.9 this AM.   - likely 2/2 congestion or poor forward flow from ADHF.  - management as above for ADHF. - baseline Cr 1.6. Stable and fluctuating.   - likely 2/2 congestion or poor forward flow from ADHF.  - management as above for ADHF.

## 2018-04-28 NOTE — DISCHARGE NOTE ADULT - HOSPITAL COURSE
57M from [place] with h/o CAD, HFrEF, s/p ICD, DM, HLD, afib on coumadin, and CKD presents with sudden SOB with acute on chronic systolic HF exacerbation, etiology unclear however may be due to unintentional med nonadherence. In ER patient treated with lasix, BiPaP, and IV NTG with good response. Transferred to CCU for continued monitoring. Has diuresed well and is now off IV NTG and BiPaP, transitioned to po meds.  Plan is for nuclear stress test on Sunday. 57M from [place] with h/o CAD, HFrEF, s/p ICD, DM, HLD, afib on coumadin, and CKD presents with sudden SOB with acute on chronic systolic HF exacerbation, etiology unclear however may be due to unintentional med nonadherence. In ER patient treated with lasix, BiPaP, and IV NTG with good response. Transferred to CCU for continued monitoring. Has diuresed well and is now off IV NTG and BiPaP, transitioned to po meds.  On 4/29 patient with urinary burning and + UTI, started ceftriaxone and pyridium. Also with fever and pancultured with RVP. CXR clear. Creatinine uptrending, lasix placed on hold given fevers and elevated Scr. Will likely need ischemia evaluation once afebrile. 57M from [place] with h/o CAD, HFrEF, s/p ICD, DM, HLD, afib on coumadin, and CKD presents with sudden SOB with acute on chronic systolic HF exacerbation, etiology unclear however may be due to unintentional med nonadherence. In ER patient treated with lasix, BiPaP, and IV NTG with good response. Transferred to CCU for continued monitoring. Has diuresed well and is now off IV NTG and BiPaP, transitioned to po meds.  On 4/29 patient with urinary burning and + UTI, started ceftriaxone and pyridium. Also with fever and pancultured with RVP. CXR clear. Creatinine uptrending, lasix placed on hold given fevers and elevated Scr. Stress test with partially reversible ischemia, will manage with medical treatment. Pt doing better, Ceftriaxone switched to Cipro to complete 10 day course. Pt is now medically stable for discharge home.

## 2018-04-29 DIAGNOSIS — I50.23 ACUTE ON CHRONIC SYSTOLIC (CONGESTIVE) HEART FAILURE: ICD-10-CM

## 2018-04-29 DIAGNOSIS — R50.9 FEVER, UNSPECIFIED: ICD-10-CM

## 2018-04-29 DIAGNOSIS — N17.9 ACUTE KIDNEY FAILURE, UNSPECIFIED: ICD-10-CM

## 2018-04-29 LAB
APTT BLD: 43.5 SEC — HIGH (ref 27.5–37.4)
B PERT DNA SPEC QL NAA+PROBE: SIGNIFICANT CHANGE UP
BUN SERPL-MCNC: 41 MG/DL — HIGH (ref 7–23)
C PNEUM DNA SPEC QL NAA+PROBE: NOT DETECTED — SIGNIFICANT CHANGE UP
CALCIUM SERPL-MCNC: 9.5 MG/DL — SIGNIFICANT CHANGE UP (ref 8.4–10.5)
CHLORIDE SERPL-SCNC: 94 MMOL/L — LOW (ref 98–107)
CO2 SERPL-SCNC: 26 MMOL/L — SIGNIFICANT CHANGE UP (ref 22–31)
CREAT SERPL-MCNC: 2.01 MG/DL — HIGH (ref 0.5–1.3)
FLUAV H1 2009 PAND RNA SPEC QL NAA+PROBE: NOT DETECTED — SIGNIFICANT CHANGE UP
FLUAV H1 RNA SPEC QL NAA+PROBE: NOT DETECTED — SIGNIFICANT CHANGE UP
FLUAV H3 RNA SPEC QL NAA+PROBE: NOT DETECTED — SIGNIFICANT CHANGE UP
FLUAV SUBTYP SPEC NAA+PROBE: SIGNIFICANT CHANGE UP
FLUBV RNA SPEC QL NAA+PROBE: NOT DETECTED — SIGNIFICANT CHANGE UP
GLUCOSE BLDC GLUCOMTR-MCNC: 154 MG/DL — HIGH (ref 70–99)
GLUCOSE BLDC GLUCOMTR-MCNC: 167 MG/DL — HIGH (ref 70–99)
GLUCOSE BLDC GLUCOMTR-MCNC: 179 MG/DL — HIGH (ref 70–99)
GLUCOSE BLDC GLUCOMTR-MCNC: 217 MG/DL — HIGH (ref 70–99)
GLUCOSE SERPL-MCNC: 174 MG/DL — HIGH (ref 70–99)
HADV DNA SPEC QL NAA+PROBE: NOT DETECTED — SIGNIFICANT CHANGE UP
HCOV 229E RNA SPEC QL NAA+PROBE: NOT DETECTED — SIGNIFICANT CHANGE UP
HCOV HKU1 RNA SPEC QL NAA+PROBE: NOT DETECTED — SIGNIFICANT CHANGE UP
HCOV NL63 RNA SPEC QL NAA+PROBE: NOT DETECTED — SIGNIFICANT CHANGE UP
HCOV OC43 RNA SPEC QL NAA+PROBE: NOT DETECTED — SIGNIFICANT CHANGE UP
HCT VFR BLD CALC: 44 % — SIGNIFICANT CHANGE UP (ref 39–50)
HGB BLD-MCNC: 14.3 G/DL — SIGNIFICANT CHANGE UP (ref 13–17)
HMPV RNA SPEC QL NAA+PROBE: NOT DETECTED — SIGNIFICANT CHANGE UP
HPIV1 RNA SPEC QL NAA+PROBE: NOT DETECTED — SIGNIFICANT CHANGE UP
HPIV2 RNA SPEC QL NAA+PROBE: NOT DETECTED — SIGNIFICANT CHANGE UP
HPIV3 RNA SPEC QL NAA+PROBE: NOT DETECTED — SIGNIFICANT CHANGE UP
HPIV4 RNA SPEC QL NAA+PROBE: NOT DETECTED — SIGNIFICANT CHANGE UP
INR BLD: 1.73 — HIGH (ref 0.88–1.17)
M PNEUMO DNA SPEC QL NAA+PROBE: NOT DETECTED — SIGNIFICANT CHANGE UP
MAGNESIUM SERPL-MCNC: 2 MG/DL — SIGNIFICANT CHANGE UP (ref 1.6–2.6)
MCHC RBC-ENTMCNC: 28.8 PG — SIGNIFICANT CHANGE UP (ref 27–34)
MCHC RBC-ENTMCNC: 32.5 % — SIGNIFICANT CHANGE UP (ref 32–36)
MCV RBC AUTO: 88.5 FL — SIGNIFICANT CHANGE UP (ref 80–100)
NRBC # FLD: 0 — SIGNIFICANT CHANGE UP
PHOSPHATE SERPL-MCNC: 3.5 MG/DL — SIGNIFICANT CHANGE UP (ref 2.5–4.5)
PLATELET # BLD AUTO: 195 K/UL — SIGNIFICANT CHANGE UP (ref 150–400)
PMV BLD: 12.2 FL — SIGNIFICANT CHANGE UP (ref 7–13)
POTASSIUM SERPL-MCNC: 3.5 MMOL/L — SIGNIFICANT CHANGE UP (ref 3.5–5.3)
POTASSIUM SERPL-SCNC: 3.5 MMOL/L — SIGNIFICANT CHANGE UP (ref 3.5–5.3)
PROTHROM AB SERPL-ACNC: 20.1 SEC — HIGH (ref 9.8–13.1)
RBC # BLD: 4.97 M/UL — SIGNIFICANT CHANGE UP (ref 4.2–5.8)
RBC # FLD: 13.2 % — SIGNIFICANT CHANGE UP (ref 10.3–14.5)
RSV RNA SPEC QL NAA+PROBE: NOT DETECTED — SIGNIFICANT CHANGE UP
RV+EV RNA SPEC QL NAA+PROBE: NOT DETECTED — SIGNIFICANT CHANGE UP
SODIUM SERPL-SCNC: 138 MMOL/L — SIGNIFICANT CHANGE UP (ref 135–145)
WBC # BLD: 17.46 K/UL — HIGH (ref 3.8–10.5)
WBC # FLD AUTO: 17.46 K/UL — HIGH (ref 3.8–10.5)

## 2018-04-29 PROCEDURE — 99233 SBSQ HOSP IP/OBS HIGH 50: CPT

## 2018-04-29 PROCEDURE — 71045 X-RAY EXAM CHEST 1 VIEW: CPT | Mod: 26

## 2018-04-29 RX ORDER — WARFARIN SODIUM 2.5 MG/1
7.5 TABLET ORAL ONCE
Qty: 0 | Refills: 0 | Status: COMPLETED | OUTPATIENT
Start: 2018-04-29 | End: 2018-04-29

## 2018-04-29 RX ORDER — ACETAMINOPHEN 500 MG
650 TABLET ORAL EVERY 6 HOURS
Qty: 0 | Refills: 0 | Status: DISCONTINUED | OUTPATIENT
Start: 2018-04-29 | End: 2018-04-29

## 2018-04-29 RX ORDER — ACETAMINOPHEN 500 MG
650 TABLET ORAL EVERY 4 HOURS
Qty: 0 | Refills: 0 | Status: DISCONTINUED | OUTPATIENT
Start: 2018-04-29 | End: 2018-05-03

## 2018-04-29 RX ORDER — CEFTRIAXONE 500 MG/1
1 INJECTION, POWDER, FOR SOLUTION INTRAMUSCULAR; INTRAVENOUS EVERY 24 HOURS
Qty: 0 | Refills: 0 | Status: DISCONTINUED | OUTPATIENT
Start: 2018-04-29 | End: 2018-04-29

## 2018-04-29 RX ORDER — POTASSIUM CHLORIDE 20 MEQ
40 PACKET (EA) ORAL ONCE
Qty: 0 | Refills: 0 | Status: COMPLETED | OUTPATIENT
Start: 2018-04-29 | End: 2018-04-29

## 2018-04-29 RX ORDER — GABAPENTIN 400 MG/1
300 CAPSULE ORAL EVERY 12 HOURS
Qty: 0 | Refills: 0 | Status: DISCONTINUED | OUTPATIENT
Start: 2018-04-29 | End: 2018-05-03

## 2018-04-29 RX ORDER — PHENAZOPYRIDINE HCL 100 MG
100 TABLET ORAL EVERY 8 HOURS
Qty: 0 | Refills: 0 | Status: DISCONTINUED | OUTPATIENT
Start: 2018-04-29 | End: 2018-05-01

## 2018-04-29 RX ORDER — CEFTRIAXONE 500 MG/1
1 INJECTION, POWDER, FOR SOLUTION INTRAMUSCULAR; INTRAVENOUS EVERY 24 HOURS
Qty: 0 | Refills: 0 | Status: DISCONTINUED | OUTPATIENT
Start: 2018-04-29 | End: 2018-05-03

## 2018-04-29 RX ADMIN — GABAPENTIN 300 MILLIGRAM(S): 400 CAPSULE ORAL at 17:31

## 2018-04-29 RX ADMIN — Medication 100 MILLIGRAM(S): at 08:58

## 2018-04-29 RX ADMIN — ISOSORBIDE MONONITRATE 30 MILLIGRAM(S): 60 TABLET, EXTENDED RELEASE ORAL at 12:22

## 2018-04-29 RX ADMIN — Medication 2: at 17:31

## 2018-04-29 RX ADMIN — WARFARIN SODIUM 7.5 MILLIGRAM(S): 2.5 TABLET ORAL at 17:31

## 2018-04-29 RX ADMIN — Medication 650 MILLIGRAM(S): at 21:13

## 2018-04-29 RX ADMIN — Medication 1: at 08:59

## 2018-04-29 RX ADMIN — CARVEDILOL PHOSPHATE 3.12 MILLIGRAM(S): 80 CAPSULE, EXTENDED RELEASE ORAL at 17:31

## 2018-04-29 RX ADMIN — Medication 81 MILLIGRAM(S): at 12:23

## 2018-04-29 RX ADMIN — Medication 650 MILLIGRAM(S): at 14:12

## 2018-04-29 RX ADMIN — Medication 650 MILLIGRAM(S): at 14:50

## 2018-04-29 RX ADMIN — Medication 650 MILLIGRAM(S): at 09:00

## 2018-04-29 RX ADMIN — CARVEDILOL PHOSPHATE 3.12 MILLIGRAM(S): 80 CAPSULE, EXTENDED RELEASE ORAL at 06:24

## 2018-04-29 RX ADMIN — CEFTRIAXONE 100 GRAM(S): 500 INJECTION, POWDER, FOR SOLUTION INTRAMUSCULAR; INTRAVENOUS at 22:20

## 2018-04-29 RX ADMIN — Medication 40 MILLIEQUIVALENT(S): at 12:22

## 2018-04-29 RX ADMIN — CEFTRIAXONE 100 GRAM(S): 500 INJECTION, POWDER, FOR SOLUTION INTRAMUSCULAR; INTRAVENOUS at 01:14

## 2018-04-29 RX ADMIN — Medication 650 MILLIGRAM(S): at 08:21

## 2018-04-29 RX ADMIN — Medication 0.12 MILLIGRAM(S): at 06:24

## 2018-04-29 RX ADMIN — ATORVASTATIN CALCIUM 40 MILLIGRAM(S): 80 TABLET, FILM COATED ORAL at 21:22

## 2018-04-29 RX ADMIN — Medication 40 MILLIGRAM(S): at 06:25

## 2018-04-29 RX ADMIN — CLOPIDOGREL BISULFATE 75 MILLIGRAM(S): 75 TABLET, FILM COATED ORAL at 12:22

## 2018-04-29 RX ADMIN — Medication 1: at 13:16

## 2018-04-29 RX ADMIN — Medication 100 MILLIGRAM(S): at 14:12

## 2018-04-29 RX ADMIN — Medication 100 MILLIGRAM(S): at 21:22

## 2018-04-29 RX ADMIN — Medication 650 MILLIGRAM(S): at 20:46

## 2018-04-29 RX ADMIN — CHLORHEXIDINE GLUCONATE 1 APPLICATION(S): 213 SOLUTION TOPICAL at 09:53

## 2018-04-29 NOTE — PROGRESS NOTE ADULT - SUBJECTIVE AND OBJECTIVE BOX
Subjective/Objective: Patient resting in bed, c/o fairly persistent HA and burning with urination (started ceftriaxone for UTI).    MEDICATIONS  (STANDING):  aspirin enteric coated 81 milliGRAM(s) Oral daily  atorvastatin 40 milliGRAM(s) Oral at bedtime  carvedilol 3.125 milliGRAM(s) Oral every 12 hours  cefTRIAXone   IVPB 1 Gram(s) IV Intermittent every 24 hours  chlorhexidine 4% Liquid 1 Application(s) Topical <User Schedule>  clopidogrel Tablet 75 milliGRAM(s) Oral daily  dextrose 5%. 1000 milliLiter(s) (50 mL/Hr) IV Continuous <Continuous>  dextrose 50% Injectable 12.5 Gram(s) IV Push once  dextrose 50% Injectable 25 Gram(s) IV Push once  dextrose 50% Injectable 25 Gram(s) IV Push once  digoxin     Tablet 0.125 milliGRAM(s) Oral daily  furosemide    Tablet 40 milliGRAM(s) Oral every 12 hours  gabapentin 300 milliGRAM(s) Oral at bedtime  influenza   Vaccine 0.5 milliLiter(s) IntraMuscular once  insulin lispro (HumaLOG) corrective regimen sliding scale   SubCutaneous three times a day before meals  insulin lispro (HumaLOG) corrective regimen sliding scale   SubCutaneous at bedtime  isosorbide   mononitrate ER Tablet (IMDUR) 30 milliGRAM(s) Oral daily  phenazopyridine 100 milliGRAM(s) Oral every 8 hours    MEDICATIONS  (PRN):  acetaminophen   Tablet. 650 milliGRAM(s) Oral every 6 hours PRN Moderate Pain (4 - 6)  dextrose Gel 1 Dose(s) Oral once PRN Blood Glucose LESS THAN 70 milliGRAM(s)/deciliter  glucagon  Injectable 1 milliGRAM(s) IntraMuscular once PRN Glucose LESS THAN 70 milligrams/deciliter          Vital Signs Last 24 Hrs  T(C): 36.4 (29 Apr 2018 04:00), Max: 36.8 (29 Apr 2018 00:00)  T(F): 97.6 (29 Apr 2018 04:00), Max: 98.2 (29 Apr 2018 00:00)  HR: 103 (29 Apr 2018 07:00) (81 - 112)  BP: 145/123 (29 Apr 2018 07:00) (108/54 - 145/123)  BP(mean): 128 (29 Apr 2018 07:00) (66 - 128)  RR: 24 (29 Apr 2018 07:00) (13 - 28)  SpO2: 96% (29 Apr 2018 07:00) (94% - 100%)  I&O's Detail    28 Apr 2018 07:01  -  29 Apr 2018 07:00  --------------------------------------------------------  IN:  Total IN: 0 mL    OUT:    Voided: 2150 mL  Total OUT: 2150 mL    Total NET: -2150 mL    PHYSICAL EXAM  GEN: mild distress with HA, skin W & D  RESP: CTA B/L  CV: nl S1S2  GI: soft, NT/ND  EXT: no C/C/E, mild R foot pain with decreased sensation  NEURO: A & O X 3      EKG/ TELEM: paced rhythm    LABS:                          14.3   17.46 )-----------( 195      ( 29 Apr 2018 05:59 )             44.0     PT/INR - ( 29 Apr 2018 05:59 )   PT: 20.1 SEC;   INR: 1.73          PTT - ( 29 Apr 2018 05:59 )  PTT:43.5 SEC  29 Apr 2018 05:59    138    |  94<L>  |  41<H>  ----------------------------<  174<H>  3.5     |  26     |  2.01<H>  28 Apr 2018 05:55    141    |  100    |  40<H>  ----------------------------<  130<H>  3.6     |  26     |  1.77<H>    Ca    9.5        29 Apr 2018 05:59  Ca    9.4        28 Apr 2018 05:55  Phos  3.5       29 Apr 2018 05:59  Phos  4.2       28 Apr 2018 05:55  Mg     2.0       29 Apr 2018 05:59  Mg     2.1       28 Apr 2018 05:55      CARDIAC MARKERS ( 28 Apr 2018 05:55 )  x     / 0.21 ng/mL / 174 u/L / -- ng/mL / x      CARDIAC MARKERS ( 27 Apr 2018 11:45 )  x     / 0.20 ng/mL / 187 u/L / 5.59 ng/mL / x          Creatine Kinase, Serum: 174 u/L (04-28-18 @ 05:55)  Creatine Kinase, Serum: 187 u/L (04-27-18 @ 11:45)  Creatine Kinase, Serum: 202 u/L (04-27-18 @ 07:50)    CKMB: -- ng/mL (04-28-18 @ 05:55)  CKMB: 5.59 ng/mL (04-27-18 @ 11:45)  CKMB: 7.17 ng/mL (04-27-18 @ 07:50)    Troponin T, Serum: 0.21 ng/mL (04-28-18 @ 05:55)  Troponin T, Serum: 0.20 ng/mL (04-27-18 @ 11:45)  Troponin T, Serum: 0.19 ng/mL (04-27-18 @ 07:50)          Diagnostic testing:        Assessment and Plan: Subjective/Objective: Patient resting in bed, c/o fairly persistent HA and burning with urination (started ceftriaxone for UTI).    MEDICATIONS  (STANDING):  aspirin enteric coated 81 milliGRAM(s) Oral daily  atorvastatin 40 milliGRAM(s) Oral at bedtime  carvedilol 3.125 milliGRAM(s) Oral every 12 hours  cefTRIAXone   IVPB 1 Gram(s) IV Intermittent every 24 hours  chlorhexidine 4% Liquid 1 Application(s) Topical <User Schedule>  clopidogrel Tablet 75 milliGRAM(s) Oral daily  dextrose 5%. 1000 milliLiter(s) (50 mL/Hr) IV Continuous <Continuous>  dextrose 50% Injectable 12.5 Gram(s) IV Push once  dextrose 50% Injectable 25 Gram(s) IV Push once  dextrose 50% Injectable 25 Gram(s) IV Push once  digoxin     Tablet 0.125 milliGRAM(s) Oral daily  furosemide    Tablet 40 milliGRAM(s) Oral every 12 hours  gabapentin 300 milliGRAM(s) Oral at bedtime  influenza   Vaccine 0.5 milliLiter(s) IntraMuscular once  insulin lispro (HumaLOG) corrective regimen sliding scale   SubCutaneous three times a day before meals  insulin lispro (HumaLOG) corrective regimen sliding scale   SubCutaneous at bedtime  isosorbide   mononitrate ER Tablet (IMDUR) 30 milliGRAM(s) Oral daily  phenazopyridine 100 milliGRAM(s) Oral every 8 hours    MEDICATIONS  (PRN):  acetaminophen   Tablet. 650 milliGRAM(s) Oral every 6 hours PRN Moderate Pain (4 - 6)  dextrose Gel 1 Dose(s) Oral once PRN Blood Glucose LESS THAN 70 milliGRAM(s)/deciliter  glucagon  Injectable 1 milliGRAM(s) IntraMuscular once PRN Glucose LESS THAN 70 milligrams/deciliter          Vital Signs Last 24 Hrs  T(C): 36.4 (29 Apr 2018 04:00), Max: 36.8 (29 Apr 2018 00:00)  T(F): 97.6 (29 Apr 2018 04:00), Max: 98.2 (29 Apr 2018 00:00)  HR: 103 (29 Apr 2018 07:00) (81 - 112)  BP: 145/123 (29 Apr 2018 07:00) (108/54 - 145/123)  BP(mean): 128 (29 Apr 2018 07:00) (66 - 128)  RR: 24 (29 Apr 2018 07:00) (13 - 28)  SpO2: 96% (29 Apr 2018 07:00) (94% - 100%)  I&O's Detail    28 Apr 2018 07:01  -  29 Apr 2018 07:00  --------------------------------------------------------  IN:  Total IN: 0 mL    OUT:    Voided: 2150 mL  Total OUT: 2150 mL    Total NET: -2150 mL    PHYSICAL EXAM  GEN: mild distress with HA, skin W & D  RESP: CTA B/L  CV: nl S1S2  GI: soft, NT/ND  EXT: no C/C/E, mild R foot pain with decreased sensation  NEURO: A & O X 3      EKG/ TELEM: paced rhythm    LABS:                          14.3   17.46 )-----------( 195      ( 29 Apr 2018 05:59 )             44.0     PT/INR - ( 29 Apr 2018 05:59 )   PT: 20.1 SEC;   INR: 1.73          PTT - ( 29 Apr 2018 05:59 )  PTT:43.5 SEC  29 Apr 2018 05:59    138    |  94<L>  |  41<H>  ----------------------------<  174<H>  3.5     |  26     |  2.01<H>    28 Apr 2018 05:55    141    |  100    |  40<H>  ----------------------------<  130<H>  3.6     |  26     |  1.77<H>    Ca    9.5        29 Apr 2018 05:59  Ca    9.4        28 Apr 2018 05:55  Phos  3.5       29 Apr 2018 05:59  Phos  4.2       28 Apr 2018 05:55  Mg     2.0       29 Apr 2018 05:59  Mg     2.1       28 Apr 2018 05:55      CARDIAC MARKERS ( 28 Apr 2018 05:55 )  x     / 0.21 ng/mL / 174 u/L / -- ng/mL / x      CARDIAC MARKERS ( 27 Apr 2018 11:45 )  x     / 0.20 ng/mL / 187 u/L / 5.59 ng/mL / x          Creatine Kinase, Serum: 174 u/L (04-28-18 @ 05:55)  Creatine Kinase, Serum: 187 u/L (04-27-18 @ 11:45)  Creatine Kinase, Serum: 202 u/L (04-27-18 @ 07:50)    CKMB: -- ng/mL (04-28-18 @ 05:55)  CKMB: 5.59 ng/mL (04-27-18 @ 11:45)  CKMB: 7.17 ng/mL (04-27-18 @ 07:50)    Troponin T, Serum: 0.21 ng/mL (04-28-18 @ 05:55)  Troponin T, Serum: 0.20 ng/mL (04-27-18 @ 11:45)  Troponin T, Serum: 0.19 ng/mL (04-27-18 @ 07:50) Subjective/Objective: Patient resting in bed, c/o fairly persistent HA and burning with urination (started ceftriaxone for UTI). Mildly tachycardic and noted to be febrile this am.    MEDICATIONS  (STANDING):  aspirin enteric coated 81 milliGRAM(s) Oral daily  atorvastatin 40 milliGRAM(s) Oral at bedtime  carvedilol 3.125 milliGRAM(s) Oral every 12 hours  cefTRIAXone   IVPB 1 Gram(s) IV Intermittent every 24 hours  chlorhexidine 4% Liquid 1 Application(s) Topical <User Schedule>  clopidogrel Tablet 75 milliGRAM(s) Oral daily  dextrose 5%. 1000 milliLiter(s) (50 mL/Hr) IV Continuous <Continuous>  dextrose 50% Injectable 12.5 Gram(s) IV Push once  dextrose 50% Injectable 25 Gram(s) IV Push once  dextrose 50% Injectable 25 Gram(s) IV Push once  digoxin     Tablet 0.125 milliGRAM(s) Oral daily  furosemide    Tablet 40 milliGRAM(s) Oral every 12 hours  gabapentin 300 milliGRAM(s) Oral at bedtime  influenza   Vaccine 0.5 milliLiter(s) IntraMuscular once  insulin lispro (HumaLOG) corrective regimen sliding scale   SubCutaneous three times a day before meals  insulin lispro (HumaLOG) corrective regimen sliding scale   SubCutaneous at bedtime  isosorbide   mononitrate ER Tablet (IMDUR) 30 milliGRAM(s) Oral daily  phenazopyridine 100 milliGRAM(s) Oral every 8 hours    MEDICATIONS  (PRN):  acetaminophen   Tablet. 650 milliGRAM(s) Oral every 6 hours PRN Moderate Pain (4 - 6)  dextrose Gel 1 Dose(s) Oral once PRN Blood Glucose LESS THAN 70 milliGRAM(s)/deciliter  glucagon  Injectable 1 milliGRAM(s) IntraMuscular once PRN Glucose LESS THAN 70 milligrams/deciliter          Vital Signs Last 24 Hrs  T(C): 36.4 (29 Apr 2018 04:00), Max: 36.8 (29 Apr 2018 00:00)  T(F): 97.6 (29 Apr 2018 04:00), Max: 98.2 (29 Apr 2018 00:00)  HR: 103 (29 Apr 2018 07:00) (81 - 112)  BP: 145/123 (29 Apr 2018 07:00) (108/54 - 145/123)  BP(mean): 128 (29 Apr 2018 07:00) (66 - 128)  RR: 24 (29 Apr 2018 07:00) (13 - 28)  SpO2: 96% (29 Apr 2018 07:00) (94% - 100%)  I&O's Detail    28 Apr 2018 07:01  -  29 Apr 2018 07:00  --------------------------------------------------------  IN:  Total IN: 0 mL    OUT:    Voided: 2150 mL  Total OUT: 2150 mL    Total NET: -2150 mL    PHYSICAL EXAM  GEN: mild distress with HA, skin W & D  RESP: CTA B/L  CV: nl S1S2  GI: soft, NT/ND  EXT: no C/C/E, mild R foot pain with decreased sensation  NEURO: A & O X 3      EKG/ TELEM: paced rhythm    LABS:                          14.3   17.46 )-----------( 195      ( 29 Apr 2018 05:59 )             44.0     PT/INR - ( 29 Apr 2018 05:59 )   PT: 20.1 SEC;   INR: 1.73          PTT - ( 29 Apr 2018 05:59 )  PTT:43.5 SEC  29 Apr 2018 05:59    138    |  94<L>  |  41<H>  ----------------------------<  174<H>  3.5     |  26     |  2.01<H>    28 Apr 2018 05:55    141    |  100    |  40<H>  ----------------------------<  130<H>  3.6     |  26     |  1.77<H>    Ca    9.5        29 Apr 2018 05:59  Ca    9.4        28 Apr 2018 05:55  Phos  3.5       29 Apr 2018 05:59  Phos  4.2       28 Apr 2018 05:55  Mg     2.0       29 Apr 2018 05:59  Mg     2.1       28 Apr 2018 05:55      CARDIAC MARKERS ( 28 Apr 2018 05:55 )  x     / 0.21 ng/mL / 174 u/L / -- ng/mL / x      CARDIAC MARKERS ( 27 Apr 2018 11:45 )  x     / 0.20 ng/mL / 187 u/L / 5.59 ng/mL / x          Creatine Kinase, Serum: 174 u/L (04-28-18 @ 05:55)  Creatine Kinase, Serum: 187 u/L (04-27-18 @ 11:45)  Creatine Kinase, Serum: 202 u/L (04-27-18 @ 07:50)    CKMB: -- ng/mL (04-28-18 @ 05:55)  CKMB: 5.59 ng/mL (04-27-18 @ 11:45)  CKMB: 7.17 ng/mL (04-27-18 @ 07:50)    Troponin T, Serum: 0.21 ng/mL (04-28-18 @ 05:55)  Troponin T, Serum: 0.20 ng/mL (04-27-18 @ 11:45)  Troponin T, Serum: 0.19 ng/mL (04-27-18 @ 07:50)

## 2018-04-29 NOTE — PROGRESS NOTE ADULT - PROBLEM SELECTOR PLAN 3
+ UTI likely cause, urine culture pending. Tmax 101.2 blood cultures, RVP, CXR ordered. Continue ceftriaxone for total 7 days. Pyridium started for urinary burning.

## 2018-04-29 NOTE — PROGRESS NOTE ADULT - PROBLEM SELECTOR PLAN 1
Continue coreg, digoxin, imdur. Strict I & O. Given mildly elevated Scr and new fever will hold diuretics at present and monitor. Consider eventual ischemia evaluation for etiology of decompensated HF. Nuclear stress cancelled today due fever. Continue coreg, digoxin, imdur. Strict I & O. Given mildly elevated Scr and new fever will hold diuretics at present and monitor. Consider eventual ischemia evaluation for etiology of decompensated HF. Nuclear stress cancelled today due to fever.

## 2018-04-29 NOTE — PROGRESS NOTE ADULT - ASSESSMENT
57M with CAD, HFrEF, ICD, DM, HLD, afib on coumadin, and CKD presents with sudden onset SOB -- acute on chronic systolic HF exacerbation with hypoxic hypercapneic resp failure requiring diuresis, IV NTG, and BiPaP. Now improved. 57M with CAD, HFrEF, ICD, DM, HLD, afib on coumadin, and CKD presents with sudden onset SOB -- acute on chronic systolic HF exacerbation with hypoxic hypercapneic resp failure requiring diuresis, IV NTG, and BiPaP. Now improved from cardiac perspective. Today with fever and positive UTI.

## 2018-04-30 DIAGNOSIS — N39.0 URINARY TRACT INFECTION, SITE NOT SPECIFIED: ICD-10-CM

## 2018-04-30 DIAGNOSIS — I50.23 ACUTE ON CHRONIC SYSTOLIC (CONGESTIVE) HEART FAILURE: ICD-10-CM

## 2018-04-30 LAB
ALBUMIN SERPL ELPH-MCNC: 3.9 G/DL — SIGNIFICANT CHANGE UP (ref 3.3–5)
ALP SERPL-CCNC: 78 U/L — SIGNIFICANT CHANGE UP (ref 40–120)
ALT FLD-CCNC: 17 U/L — SIGNIFICANT CHANGE UP (ref 4–41)
APTT BLD: 43.3 SEC — HIGH (ref 27.5–37.4)
AST SERPL-CCNC: 16 U/L — SIGNIFICANT CHANGE UP (ref 4–40)
BILIRUB SERPL-MCNC: 0.7 MG/DL — SIGNIFICANT CHANGE UP (ref 0.2–1.2)
BUN SERPL-MCNC: 42 MG/DL — HIGH (ref 7–23)
BUN SERPL-MCNC: 42 MG/DL — HIGH (ref 7–23)
CALCIUM SERPL-MCNC: 9.1 MG/DL — SIGNIFICANT CHANGE UP (ref 8.4–10.5)
CALCIUM SERPL-MCNC: 9.1 MG/DL — SIGNIFICANT CHANGE UP (ref 8.4–10.5)
CHLORIDE SERPL-SCNC: 94 MMOL/L — LOW (ref 98–107)
CHLORIDE SERPL-SCNC: 94 MMOL/L — LOW (ref 98–107)
CO2 SERPL-SCNC: 24 MMOL/L — SIGNIFICANT CHANGE UP (ref 22–31)
CO2 SERPL-SCNC: 24 MMOL/L — SIGNIFICANT CHANGE UP (ref 22–31)
CREAT SERPL-MCNC: 1.81 MG/DL — HIGH (ref 0.5–1.3)
CREAT SERPL-MCNC: 1.81 MG/DL — HIGH (ref 0.5–1.3)
GLUCOSE BLDC GLUCOMTR-MCNC: 145 MG/DL — HIGH (ref 70–99)
GLUCOSE BLDC GLUCOMTR-MCNC: 159 MG/DL — HIGH (ref 70–99)
GLUCOSE BLDC GLUCOMTR-MCNC: 169 MG/DL — HIGH (ref 70–99)
GLUCOSE BLDC GLUCOMTR-MCNC: 383 MG/DL — HIGH (ref 70–99)
GLUCOSE SERPL-MCNC: 158 MG/DL — HIGH (ref 70–99)
GLUCOSE SERPL-MCNC: 158 MG/DL — HIGH (ref 70–99)
HCT VFR BLD CALC: 43.1 % — SIGNIFICANT CHANGE UP (ref 39–50)
HGB BLD-MCNC: 13.8 G/DL — SIGNIFICANT CHANGE UP (ref 13–17)
INR BLD: 2 — HIGH (ref 0.88–1.17)
MAGNESIUM SERPL-MCNC: 2.1 MG/DL — SIGNIFICANT CHANGE UP (ref 1.6–2.6)
MCHC RBC-ENTMCNC: 28.7 PG — SIGNIFICANT CHANGE UP (ref 27–34)
MCHC RBC-ENTMCNC: 32 % — SIGNIFICANT CHANGE UP (ref 32–36)
MCV RBC AUTO: 89.6 FL — SIGNIFICANT CHANGE UP (ref 80–100)
NRBC # FLD: 0 — SIGNIFICANT CHANGE UP
PHOSPHATE SERPL-MCNC: 3.2 MG/DL — SIGNIFICANT CHANGE UP (ref 2.5–4.5)
PLATELET # BLD AUTO: 171 K/UL — SIGNIFICANT CHANGE UP (ref 150–400)
PMV BLD: 12.2 FL — SIGNIFICANT CHANGE UP (ref 7–13)
POTASSIUM SERPL-MCNC: 3.7 MMOL/L — SIGNIFICANT CHANGE UP (ref 3.5–5.3)
POTASSIUM SERPL-MCNC: 3.7 MMOL/L — SIGNIFICANT CHANGE UP (ref 3.5–5.3)
POTASSIUM SERPL-SCNC: 3.7 MMOL/L — SIGNIFICANT CHANGE UP (ref 3.5–5.3)
POTASSIUM SERPL-SCNC: 3.7 MMOL/L — SIGNIFICANT CHANGE UP (ref 3.5–5.3)
PROT SERPL-MCNC: 8.1 G/DL — SIGNIFICANT CHANGE UP (ref 6–8.3)
PROTHROM AB SERPL-ACNC: 22.5 SEC — HIGH (ref 9.8–13.1)
RBC # BLD: 4.81 M/UL — SIGNIFICANT CHANGE UP (ref 4.2–5.8)
RBC # FLD: 13.3 % — SIGNIFICANT CHANGE UP (ref 10.3–14.5)
SODIUM SERPL-SCNC: 138 MMOL/L — SIGNIFICANT CHANGE UP (ref 135–145)
SODIUM SERPL-SCNC: 138 MMOL/L — SIGNIFICANT CHANGE UP (ref 135–145)
SPECIMEN SOURCE: SIGNIFICANT CHANGE UP
WBC # BLD: 15.01 K/UL — HIGH (ref 3.8–10.5)
WBC # FLD AUTO: 15.01 K/UL — HIGH (ref 3.8–10.5)

## 2018-04-30 PROCEDURE — 99233 SBSQ HOSP IP/OBS HIGH 50: CPT

## 2018-04-30 RX ORDER — HYDRALAZINE HCL 50 MG
10 TABLET ORAL EVERY 8 HOURS
Qty: 0 | Refills: 0 | Status: DISCONTINUED | OUTPATIENT
Start: 2018-04-30 | End: 2018-05-03

## 2018-04-30 RX ORDER — LANOLIN ALCOHOL/MO/W.PET/CERES
3 CREAM (GRAM) TOPICAL AT BEDTIME
Qty: 0 | Refills: 0 | Status: DISCONTINUED | OUTPATIENT
Start: 2018-04-30 | End: 2018-05-03

## 2018-04-30 RX ORDER — WARFARIN SODIUM 2.5 MG/1
6 TABLET ORAL ONCE
Qty: 0 | Refills: 0 | Status: DISCONTINUED | OUTPATIENT
Start: 2018-04-30 | End: 2018-04-30

## 2018-04-30 RX ORDER — WARFARIN SODIUM 2.5 MG/1
5 TABLET ORAL ONCE
Qty: 0 | Refills: 0 | Status: COMPLETED | OUTPATIENT
Start: 2018-04-30 | End: 2018-04-30

## 2018-04-30 RX ADMIN — Medication 650 MILLIGRAM(S): at 09:54

## 2018-04-30 RX ADMIN — Medication 1: at 08:46

## 2018-04-30 RX ADMIN — WARFARIN SODIUM 5 MILLIGRAM(S): 2.5 TABLET ORAL at 17:15

## 2018-04-30 RX ADMIN — CEFTRIAXONE 100 GRAM(S): 500 INJECTION, POWDER, FOR SOLUTION INTRAMUSCULAR; INTRAVENOUS at 21:20

## 2018-04-30 RX ADMIN — GABAPENTIN 300 MILLIGRAM(S): 400 CAPSULE ORAL at 05:45

## 2018-04-30 RX ADMIN — Medication 100 MILLIGRAM(S): at 05:45

## 2018-04-30 RX ADMIN — GABAPENTIN 300 MILLIGRAM(S): 400 CAPSULE ORAL at 17:15

## 2018-04-30 RX ADMIN — Medication 0.12 MILLIGRAM(S): at 05:45

## 2018-04-30 RX ADMIN — Medication 10 MILLIGRAM(S): at 13:51

## 2018-04-30 RX ADMIN — CARVEDILOL PHOSPHATE 3.12 MILLIGRAM(S): 80 CAPSULE, EXTENDED RELEASE ORAL at 17:16

## 2018-04-30 RX ADMIN — ATORVASTATIN CALCIUM 40 MILLIGRAM(S): 80 TABLET, FILM COATED ORAL at 21:20

## 2018-04-30 RX ADMIN — Medication 10 MILLIGRAM(S): at 21:20

## 2018-04-30 RX ADMIN — Medication 81 MILLIGRAM(S): at 12:34

## 2018-04-30 RX ADMIN — Medication 5: at 12:33

## 2018-04-30 RX ADMIN — Medication 100 MILLIGRAM(S): at 13:51

## 2018-04-30 RX ADMIN — CLOPIDOGREL BISULFATE 75 MILLIGRAM(S): 75 TABLET, FILM COATED ORAL at 12:34

## 2018-04-30 RX ADMIN — CARVEDILOL PHOSPHATE 3.12 MILLIGRAM(S): 80 CAPSULE, EXTENDED RELEASE ORAL at 05:45

## 2018-04-30 RX ADMIN — Medication 650 MILLIGRAM(S): at 10:49

## 2018-04-30 RX ADMIN — Medication 1: at 17:14

## 2018-04-30 RX ADMIN — Medication 100 MILLIGRAM(S): at 21:20

## 2018-04-30 NOTE — PROGRESS NOTE ADULT - PROBLEM SELECTOR PLAN 1
- Now off BiPAP.  - On hold, lasix   - C/w Imdur, Coreg   - Start hydralazine 10mg PO TID  - Strict I/O's, Daily weight

## 2018-04-30 NOTE — PROGRESS NOTE ADULT - ASSESSMENT
57M Hx of CAD, CHF (EF 25%) s/p AICD, DM 2, HLD, Afib (On Coumadin), CKD, admitted for acute hypoxic hypercapnic RF 2/2 ADHF, UTI, elevated cardiac enzymes concerning for ischemic, pending LHC. 57M Hx of CAD, CHF (EF 25%) s/p AICD, DM 2, HLD, Afib (On Coumadin), CKD, admitted for acute hypoxic hypercapnic RF 2/2 ADHF, UTI, elevated cardiac enzymes concerning for ischemic, possible NST before discharge.

## 2018-04-30 NOTE — PROGRESS NOTE ADULT - SUBJECTIVE AND OBJECTIVE BOX
Tele: NSR    Overnight: Patient c/o of chills this AM. No chest pain, SOB, palpitations or fever.    MEDICATIONS  (STANDING):  aspirin enteric coated 81 milliGRAM(s) Oral daily  atorvastatin 40 milliGRAM(s) Oral at bedtime  carvedilol 3.125 milliGRAM(s) Oral every 12 hours  cefTRIAXone   IVPB 1 Gram(s) IV Intermittent every 24 hours  clopidogrel Tablet 75 milliGRAM(s) Oral daily  digoxin     Tablet 0.125 milliGRAM(s) Oral daily  gabapentin 300 milliGRAM(s) Oral every 12 hours  influenza   Vaccine 0.5 milliLiter(s) IntraMuscular once  insulin lispro (HumaLOG) corrective regimen sliding scale   SubCutaneous three times a day before meals  insulin lispro (HumaLOG) corrective regimen sliding scale   SubCutaneous at bedtime  isosorbide   mononitrate ER Tablet (IMDUR) 30 milliGRAM(s) Oral daily  phenazopyridine 100 milliGRAM(s) Oral every 8 hours    MEDICATIONS  (PRN):  acetaminophen   Tablet. 650 milliGRAM(s) Oral every 4 hours PRN Moderate Pain (4 - 6)  dextrose Gel 1 Dose(s) Oral once PRN Blood Glucose LESS THAN 70 milliGRAM(s)/deciliter  glucagon  Injectable 1 milliGRAM(s) IntraMuscular once PRN Glucose LESS THAN 70 milligrams/deciliter          Vital Signs Last 24 Hrs  T(C): 36.7 (30 Apr 2018 05:43), Max: 38.4 (29 Apr 2018 09:00)  T(F): 98.1 (30 Apr 2018 05:43), Max: 101.2 (29 Apr 2018 09:00)  HR: 95 (30 Apr 2018 05:43) (93 - 123)  BP: 130/70 (30 Apr 2018 05:43) (102/70 - 136/74)  BP(mean): 75 (29 Apr 2018 20:00) (66 - 100)  RR: 18 (30 Apr 2018 05:43) (14 - 28)  SpO2: 99% (30 Apr 2018 05:43) (87% - 100%)  I&O's Detail    29 Apr 2018 07:01  -  30 Apr 2018 07:00  --------------------------------------------------------  IN:    Oral Fluid: 950 mL  Total IN: 950 mL    OUT:    Voided: 1600 mL  Total OUT: 1600 mL    Total NET: -650 mL    Physical exam:   Gen- NAD  Resp- Clear b/l  CV- S1S2 RRR  ABD- +BSx4 ND/NT  EXT- No edema   Neuro- A&Ox3                             13.8   15.01 )-----------( 171      ( 30 Apr 2018 06:20 )             43.1     PT/INR - ( 29 Apr 2018 05:59 )   PT: 20.1 SEC;   INR: 1.73          PTT - ( 29 Apr 2018 05:59 )  PTT:43.5 SEC  29 Apr 2018 05:59    138    |  94<L>  |  41<H>  ----------------------------<  174<H>  3.5     |  26     |  2.01<H>    Ca    9.5        29 Apr 2018 05:59  Phos  3.5       29 Apr 2018 05:59  Mg     2.0       29 Apr 2018 05:59    TTE with Doppler (w/Cont) (04.27.18 @ 17:49)   DIMENSIONS:  Dimensions:     Normal Values:  LA:     4.2 cm    2.0 - 4.0 cm  Ao:     3.7 cm    2.0 - 3.8 cm  SEPTUM: 0.8 cm    0.6 - 1.2 cm  PWT:    0.8 cm    0.6 - 1.1 cm  LVIDd:  6.7 cm    3.0 - 5.6 cm  LVIDs:    ---     1.8 - 4.0 cm  Derived Variables:  LVMI: 120 g/m2  RWT: 0.23  Ejection Fraction (Visual Estimate): 20 %  ------------------------------------------------------------------------  OBSERVATIONS:  Mitral Valve: Normal mitral valve. Mild mitral  regurgitation.  Aortic Root: Normal aortic root.  Aortic Valve: Normal trileaflet aortic valve.  Left Atrium: Normal left atrium.  Left Ventricle: Endocardial visualization enhanced with  intravenous injection of echo contrast (Definity). Severe  global left ventricular systolic dysfunction. No LV  thrombus seen. Moderate left ventricular enlargement.  Right Heart: Normal right atrium. Normal right ventricular  size and function. Device wire is noted in the right heart.  Normal tricuspid valve. Normal pulmonic valve.  Pericardium/PleuraNormal pericardium with no pericardial  effusion.  ------------------------------------------------------------------------  CONCLUSIONS:  1. Moderate left ventricular enlargement.  2. Endocardial visualization enhanced with intravenous  injection of echo contrast (Definity). Severe global left  ventricular systolic dysfunction. No LV thrombus seen.  3. Normal right ventricular size and function. Device wire  is noted in the right heart.

## 2018-04-30 NOTE — PROGRESS NOTE ADULT - PROBLEM SELECTOR PLAN 3
- +CE likely to demand ischemia   - continue with plavix/aspirin.  - No history of LHC - +CE likely to demand ischemia   - continue with plavix/aspirin.  - Possible NST before discharge

## 2018-04-30 NOTE — PROGRESS NOTE ADULT - PROBLEM SELECTOR PLAN 7
- Diet: consistent carbohydrate, DASH/TLC.   - DVT ppx: on coumadin.   - Dispo: ischemic w/u. NST pending.    Richard Wheat MD  [facility]   Pager # [phone]/ [phone]

## 2018-04-30 NOTE — PROGRESS NOTE ADULT - PROBLEM SELECTOR PLAN 6
- on glipizide at home. No insulin.   - c/w LDISS. FSG well controlled monitor 24 hour requirements.

## 2018-04-30 NOTE — PROGRESS NOTE ADULT - ATTENDING COMMENTS
Patient seen and examined. 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   Now with fever and UTI getting ABX  Cont current treatment plan  Possible nuclear stress test prior to dc

## 2018-04-30 NOTE — PROGRESS NOTE ADULT - PROBLEM SELECTOR PLAN 2
- baseline Cr 1.6. Stable and fluctuating.   - likely 2/2 HF  - Hold ACE/ARB now, will consider at discharge   - Trending Scr

## 2018-05-01 DIAGNOSIS — Z02.9 ENCOUNTER FOR ADMINISTRATIVE EXAMINATIONS, UNSPECIFIED: ICD-10-CM

## 2018-05-01 LAB
-  AMIKACIN: SIGNIFICANT CHANGE UP
-  AMPICILLIN/SULBACTAM: SIGNIFICANT CHANGE UP
-  AMPICILLIN: SIGNIFICANT CHANGE UP
-  AZTREONAM: SIGNIFICANT CHANGE UP
-  CEFAZOLIN: SIGNIFICANT CHANGE UP
-  CEFEPIME: SIGNIFICANT CHANGE UP
-  CEFOXITIN: SIGNIFICANT CHANGE UP
-  CEFTAZIDIME: SIGNIFICANT CHANGE UP
-  CEFTRIAXONE: SIGNIFICANT CHANGE UP
-  CIPROFLOXACIN: SIGNIFICANT CHANGE UP
-  ERTAPENEM: SIGNIFICANT CHANGE UP
-  GENTAMICIN: SIGNIFICANT CHANGE UP
-  IMIPENEM: SIGNIFICANT CHANGE UP
-  LEVOFLOXACIN: SIGNIFICANT CHANGE UP
-  MEROPENEM: SIGNIFICANT CHANGE UP
-  NITROFURANTOIN: SIGNIFICANT CHANGE UP
-  PIPERACILLIN/TAZOBACTAM: SIGNIFICANT CHANGE UP
-  TIGECYCLINE: SIGNIFICANT CHANGE UP
-  TOBRAMYCIN: SIGNIFICANT CHANGE UP
-  TRIMETHOPRIM/SULFAMETHOXAZOLE: SIGNIFICANT CHANGE UP
APTT BLD: 40.2 SEC — HIGH (ref 27.5–37.4)
BACTERIA UR CULT: SIGNIFICANT CHANGE UP
BUN SERPL-MCNC: 40 MG/DL — HIGH (ref 7–23)
CALCIUM SERPL-MCNC: 9 MG/DL — SIGNIFICANT CHANGE UP (ref 8.4–10.5)
CHLORIDE SERPL-SCNC: 95 MMOL/L — LOW (ref 98–107)
CO2 SERPL-SCNC: 24 MMOL/L — SIGNIFICANT CHANGE UP (ref 22–31)
CREAT SERPL-MCNC: 1.75 MG/DL — HIGH (ref 0.5–1.3)
GLUCOSE BLDC GLUCOMTR-MCNC: 142 MG/DL — HIGH (ref 70–99)
GLUCOSE BLDC GLUCOMTR-MCNC: 159 MG/DL — HIGH (ref 70–99)
GLUCOSE BLDC GLUCOMTR-MCNC: 164 MG/DL — HIGH (ref 70–99)
GLUCOSE BLDC GLUCOMTR-MCNC: 216 MG/DL — HIGH (ref 70–99)
GLUCOSE SERPL-MCNC: 151 MG/DL — HIGH (ref 70–99)
HCT VFR BLD CALC: 39.2 % — SIGNIFICANT CHANGE UP (ref 39–50)
HGB BLD-MCNC: 12.7 G/DL — LOW (ref 13–17)
INR BLD: 2.01 — HIGH (ref 0.88–1.17)
MCHC RBC-ENTMCNC: 28.5 PG — SIGNIFICANT CHANGE UP (ref 27–34)
MCHC RBC-ENTMCNC: 32.4 % — SIGNIFICANT CHANGE UP (ref 32–36)
MCV RBC AUTO: 88.1 FL — SIGNIFICANT CHANGE UP (ref 80–100)
METHOD TYPE: SIGNIFICANT CHANGE UP
NRBC # FLD: 0 — SIGNIFICANT CHANGE UP
ORGANISM # SPEC MICROSCOPIC CNT: SIGNIFICANT CHANGE UP
ORGANISM # SPEC MICROSCOPIC CNT: SIGNIFICANT CHANGE UP
PLATELET # BLD AUTO: 177 K/UL — SIGNIFICANT CHANGE UP (ref 150–400)
PMV BLD: 12 FL — SIGNIFICANT CHANGE UP (ref 7–13)
POTASSIUM SERPL-MCNC: 3.8 MMOL/L — SIGNIFICANT CHANGE UP (ref 3.5–5.3)
POTASSIUM SERPL-SCNC: 3.8 MMOL/L — SIGNIFICANT CHANGE UP (ref 3.5–5.3)
PROTHROM AB SERPL-ACNC: 23.4 SEC — HIGH (ref 9.8–13.1)
RBC # BLD: 4.45 M/UL — SIGNIFICANT CHANGE UP (ref 4.2–5.8)
RBC # FLD: 13.3 % — SIGNIFICANT CHANGE UP (ref 10.3–14.5)
SODIUM SERPL-SCNC: 138 MMOL/L — SIGNIFICANT CHANGE UP (ref 135–145)
WBC # BLD: 9.48 K/UL — SIGNIFICANT CHANGE UP (ref 3.8–10.5)
WBC # FLD AUTO: 9.48 K/UL — SIGNIFICANT CHANGE UP (ref 3.8–10.5)

## 2018-05-01 PROCEDURE — 99233 SBSQ HOSP IP/OBS HIGH 50: CPT

## 2018-05-01 RX ORDER — WARFARIN SODIUM 2.5 MG/1
5 TABLET ORAL ONCE
Qty: 0 | Refills: 0 | Status: COMPLETED | OUTPATIENT
Start: 2018-05-01 | End: 2018-05-01

## 2018-05-01 RX ADMIN — CARVEDILOL PHOSPHATE 3.12 MILLIGRAM(S): 80 CAPSULE, EXTENDED RELEASE ORAL at 05:23

## 2018-05-01 RX ADMIN — ATORVASTATIN CALCIUM 40 MILLIGRAM(S): 80 TABLET, FILM COATED ORAL at 21:17

## 2018-05-01 RX ADMIN — Medication 3 MILLIGRAM(S): at 21:18

## 2018-05-01 RX ADMIN — Medication 10 MILLIGRAM(S): at 21:17

## 2018-05-01 RX ADMIN — CLOPIDOGREL BISULFATE 75 MILLIGRAM(S): 75 TABLET, FILM COATED ORAL at 11:01

## 2018-05-01 RX ADMIN — Medication 0.12 MILLIGRAM(S): at 05:23

## 2018-05-01 RX ADMIN — Medication 81 MILLIGRAM(S): at 11:01

## 2018-05-01 RX ADMIN — Medication 100 MILLIGRAM(S): at 05:23

## 2018-05-01 RX ADMIN — CARVEDILOL PHOSPHATE 3.12 MILLIGRAM(S): 80 CAPSULE, EXTENDED RELEASE ORAL at 17:24

## 2018-05-01 RX ADMIN — Medication 10 MILLIGRAM(S): at 13:00

## 2018-05-01 RX ADMIN — GABAPENTIN 300 MILLIGRAM(S): 400 CAPSULE ORAL at 05:23

## 2018-05-01 RX ADMIN — Medication 650 MILLIGRAM(S): at 19:09

## 2018-05-01 RX ADMIN — Medication 650 MILLIGRAM(S): at 19:57

## 2018-05-01 RX ADMIN — Medication 1: at 12:45

## 2018-05-01 RX ADMIN — Medication 10 MILLIGRAM(S): at 05:23

## 2018-05-01 RX ADMIN — Medication 1: at 08:22

## 2018-05-01 RX ADMIN — CEFTRIAXONE 100 GRAM(S): 500 INJECTION, POWDER, FOR SOLUTION INTRAMUSCULAR; INTRAVENOUS at 21:17

## 2018-05-01 RX ADMIN — WARFARIN SODIUM 5 MILLIGRAM(S): 2.5 TABLET ORAL at 17:24

## 2018-05-01 RX ADMIN — GABAPENTIN 300 MILLIGRAM(S): 400 CAPSULE ORAL at 17:24

## 2018-05-01 NOTE — PROGRESS NOTE ADULT - ATTENDING COMMENTS
Patient seen and examined. Agree with above assessment and plan  Patient with acute on chronic systolic heart failure  Clinically improved  For possible nuclear stress test

## 2018-05-01 NOTE — PROGRESS NOTE ADULT - SUBJECTIVE AND OBJECTIVE BOX
Subjective/Objective: Patient resting in bed, feels well, no further burning with urination.    MEDICATIONS  (STANDING):  aspirin enteric coated 81 milliGRAM(s) Oral daily  atorvastatin 40 milliGRAM(s) Oral at bedtime  carvedilol 3.125 milliGRAM(s) Oral every 12 hours  cefTRIAXone   IVPB 1 Gram(s) IV Intermittent every 24 hours  clopidogrel Tablet 75 milliGRAM(s) Oral daily  dextrose 5%. 1000 milliLiter(s) (50 mL/Hr) IV Continuous <Continuous>  dextrose 50% Injectable 12.5 Gram(s) IV Push once  dextrose 50% Injectable 25 Gram(s) IV Push once  dextrose 50% Injectable 25 Gram(s) IV Push once  digoxin     Tablet 0.125 milliGRAM(s) Oral daily  gabapentin 300 milliGRAM(s) Oral every 12 hours  hydrALAZINE 10 milliGRAM(s) Oral every 8 hours  influenza   Vaccine 0.5 milliLiter(s) IntraMuscular once  insulin lispro (HumaLOG) corrective regimen sliding scale   SubCutaneous three times a day before meals  insulin lispro (HumaLOG) corrective regimen sliding scale   SubCutaneous at bedtime    MEDICATIONS  (PRN):  acetaminophen   Tablet. 650 milliGRAM(s) Oral every 4 hours PRN Moderate Pain (4 - 6)  dextrose Gel 1 Dose(s) Oral once PRN Blood Glucose LESS THAN 70 milliGRAM(s)/deciliter  glucagon  Injectable 1 milliGRAM(s) IntraMuscular once PRN Glucose LESS THAN 70 milligrams/deciliter  melatonin 3 milliGRAM(s) Oral at bedtime PRN Insomnia          Vital Signs Last 24 Hrs  T(C): 37.6 (30 Apr 2018 20:50), Max: 37.6 (30 Apr 2018 20:50)  T(F): 99.7 (30 Apr 2018 20:50), Max: 99.7 (30 Apr 2018 20:50)  HR: 84 (01 May 2018 04:59) (84 - 96)  BP: 129/75 (01 May 2018 04:59) (109/65 - 133/78)  BP(mean): --  RR: 18 (01 May 2018 04:59) (17 - 20)  SpO2: 100% (01 May 2018 04:59) (93% - 100%)  I&O's Detail    30 Apr 2018 07:01  -  01 May 2018 07:00  --------------------------------------------------------  IN:    Oral Fluid: 1700 mL  Total IN: 1700 mL    OUT:    Voided: 975 mL  Total OUT: 975 mL    Total NET: 725 mL      01 May 2018 07:01  -  01 May 2018 09:44  --------------------------------------------------------  IN:  Total IN: 0 mL    OUT:    Voided: 600 mL  Total OUT: 600 mL    Total NET: -600 mL      PHYSICAL EXAM  GEN: NAD, skin W & D  RESP: CTA B/L  CV: nl S1S2  GI: soft, NT/ND  EXT: no C/C/E  NEURO: A & O X 3      EKG/ TELEM: paced rhythm    LABS:                          12.7   9.48  )-----------( 177      ( 01 May 2018 05:30 )             39.2     PT/INR - ( 01 May 2018 05:30 )   PT: 23.4 SEC;   INR: 2.01          PTT - ( 01 May 2018 05:30 )  PTT:40.2 SEC  01 May 2018 05:30    138    |  95<L>  |  40<H>  ----------------------------<  151<H>  3.8     |  24     |  1.75<H>    30 Apr 2018 06:20    138    |  94<L>  |  42<H>  ----------------------------<  158<H>  3.7     |  24     |  1.81<H>    Ca    9.0        01 May 2018 05:30  Ca    9.1        30 Apr 2018 06:20  Phos  3.2       30 Apr 2018 06:20  Mg     2.1       30 Apr 2018 06:20    TPro  8.1    /  Alb  3.9    /  TBili  0.7    /  DBili  x      /  AST  16     /  ALT  17     /  AlkPhos  78     30 Apr 2018 06:20

## 2018-05-01 NOTE — PROGRESS NOTE ADULT - PROBLEM SELECTOR PLAN 1
Continue coreg, hydralazine, and digoxin. Imdur discontinued due to headache. Diuretics on hold given infection will need to re-evaluate dosing at discharge. Also will plan nuclear stress test (pharmaceutical) pre discharge for ischemia eval.

## 2018-05-01 NOTE — PROGRESS NOTE ADULT - ASSESSMENT
57M with CAD, HFrEF, ICD, DM, HLD, afib on coumadin, and CKD presents with sudden onset SOB -- acute on chronic systolic HF exacerbation with hypoxic hypercapneic resp failure requiring diuresis, IV NTG, and BiPaP. Now improved from cardiac perspective. Course c/b + UTI, now on IV antibiotics.

## 2018-05-02 LAB
APTT BLD: 38.2 SEC — HIGH (ref 27.5–37.4)
BUN SERPL-MCNC: 45 MG/DL — HIGH (ref 7–23)
CALCIUM SERPL-MCNC: 9 MG/DL — SIGNIFICANT CHANGE UP (ref 8.4–10.5)
CHLORIDE SERPL-SCNC: 99 MMOL/L — SIGNIFICANT CHANGE UP (ref 98–107)
CO2 SERPL-SCNC: 23 MMOL/L — SIGNIFICANT CHANGE UP (ref 22–31)
CREAT SERPL-MCNC: 1.77 MG/DL — HIGH (ref 0.5–1.3)
GLUCOSE BLDC GLUCOMTR-MCNC: 106 MG/DL — HIGH (ref 70–99)
GLUCOSE BLDC GLUCOMTR-MCNC: 142 MG/DL — HIGH (ref 70–99)
GLUCOSE BLDC GLUCOMTR-MCNC: 153 MG/DL — HIGH (ref 70–99)
GLUCOSE BLDC GLUCOMTR-MCNC: 186 MG/DL — HIGH (ref 70–99)
GLUCOSE SERPL-MCNC: 151 MG/DL — HIGH (ref 70–99)
HCT VFR BLD CALC: 38.7 % — LOW (ref 39–50)
HGB BLD-MCNC: 12.6 G/DL — LOW (ref 13–17)
INR BLD: 1.86 — HIGH (ref 0.88–1.17)
MAGNESIUM SERPL-MCNC: 2.5 MG/DL — SIGNIFICANT CHANGE UP (ref 1.6–2.6)
MCHC RBC-ENTMCNC: 28.1 PG — SIGNIFICANT CHANGE UP (ref 27–34)
MCHC RBC-ENTMCNC: 32.6 % — SIGNIFICANT CHANGE UP (ref 32–36)
MCV RBC AUTO: 86.4 FL — SIGNIFICANT CHANGE UP (ref 80–100)
NRBC # FLD: 0 — SIGNIFICANT CHANGE UP
PLATELET # BLD AUTO: 189 K/UL — SIGNIFICANT CHANGE UP (ref 150–400)
PMV BLD: 11.7 FL — SIGNIFICANT CHANGE UP (ref 7–13)
POTASSIUM SERPL-MCNC: 3.8 MMOL/L — SIGNIFICANT CHANGE UP (ref 3.5–5.3)
POTASSIUM SERPL-SCNC: 3.8 MMOL/L — SIGNIFICANT CHANGE UP (ref 3.5–5.3)
PROTHROM AB SERPL-ACNC: 20.9 SEC — HIGH (ref 9.8–13.1)
RBC # BLD: 4.48 M/UL — SIGNIFICANT CHANGE UP (ref 4.2–5.8)
RBC # FLD: 13.1 % — SIGNIFICANT CHANGE UP (ref 10.3–14.5)
SODIUM SERPL-SCNC: 137 MMOL/L — SIGNIFICANT CHANGE UP (ref 135–145)
WBC # BLD: 6.36 K/UL — SIGNIFICANT CHANGE UP (ref 3.8–10.5)
WBC # FLD AUTO: 6.36 K/UL — SIGNIFICANT CHANGE UP (ref 3.8–10.5)

## 2018-05-02 PROCEDURE — 78452 HT MUSCLE IMAGE SPECT MULT: CPT | Mod: 26

## 2018-05-02 PROCEDURE — 93016 CV STRESS TEST SUPVJ ONLY: CPT | Mod: GC

## 2018-05-02 PROCEDURE — 99233 SBSQ HOSP IP/OBS HIGH 50: CPT

## 2018-05-02 PROCEDURE — 93018 CV STRESS TEST I&R ONLY: CPT | Mod: GC

## 2018-05-02 RX ORDER — CARVEDILOL PHOSPHATE 80 MG/1
6.25 CAPSULE, EXTENDED RELEASE ORAL EVERY 12 HOURS
Qty: 0 | Refills: 0 | Status: DISCONTINUED | OUTPATIENT
Start: 2018-05-02 | End: 2018-05-03

## 2018-05-02 RX ORDER — WARFARIN SODIUM 2.5 MG/1
6 TABLET ORAL ONCE
Qty: 0 | Refills: 0 | Status: COMPLETED | OUTPATIENT
Start: 2018-05-02 | End: 2018-05-02

## 2018-05-02 RX ADMIN — CLOPIDOGREL BISULFATE 75 MILLIGRAM(S): 75 TABLET, FILM COATED ORAL at 11:41

## 2018-05-02 RX ADMIN — Medication 0.12 MILLIGRAM(S): at 05:11

## 2018-05-02 RX ADMIN — GABAPENTIN 300 MILLIGRAM(S): 400 CAPSULE ORAL at 05:11

## 2018-05-02 RX ADMIN — Medication 81 MILLIGRAM(S): at 11:41

## 2018-05-02 RX ADMIN — CEFTRIAXONE 100 GRAM(S): 500 INJECTION, POWDER, FOR SOLUTION INTRAMUSCULAR; INTRAVENOUS at 22:23

## 2018-05-02 RX ADMIN — WARFARIN SODIUM 6 MILLIGRAM(S): 2.5 TABLET ORAL at 17:18

## 2018-05-02 RX ADMIN — CARVEDILOL PHOSPHATE 3.12 MILLIGRAM(S): 80 CAPSULE, EXTENDED RELEASE ORAL at 05:11

## 2018-05-02 RX ADMIN — CARVEDILOL PHOSPHATE 6.25 MILLIGRAM(S): 80 CAPSULE, EXTENDED RELEASE ORAL at 17:16

## 2018-05-02 RX ADMIN — ATORVASTATIN CALCIUM 40 MILLIGRAM(S): 80 TABLET, FILM COATED ORAL at 22:23

## 2018-05-02 RX ADMIN — GABAPENTIN 300 MILLIGRAM(S): 400 CAPSULE ORAL at 17:17

## 2018-05-02 RX ADMIN — Medication 10 MILLIGRAM(S): at 22:23

## 2018-05-02 RX ADMIN — Medication 10 MILLIGRAM(S): at 05:11

## 2018-05-02 RX ADMIN — Medication 10 MILLIGRAM(S): at 17:16

## 2018-05-02 RX ADMIN — Medication 1: at 12:55

## 2018-05-02 NOTE — PROGRESS NOTE ADULT - PROBLEM SELECTOR PLAN 2
Paced rhythm on coreg and digoxin. Continue coumadin, INR 2.01. CHADSVASC is 3, continue coumadin therapy for a goal INR of 2-3. Currently patient is subtherapeutic with INR of 1.86.

## 2018-05-02 NOTE — DIETITIAN INITIAL EVALUATION ADULT. - PERTINENT LABORATORY DATA
05-02 Na137 mmol/L Glu 151 mg/dL<H> K+ 3.8 mmol/L Cr  1.77 mg/dL<H> BUN 45 mg/dL<H> 04-30 Phos 3.2 mg/dL 04-30 Alb 3.9 g/dL 04-27 [id number] 6.7 %<H>

## 2018-05-02 NOTE — DIETITIAN INITIAL EVALUATION ADULT. - OTHER INFO
Nutrition consult received for RD-heart failure linked order set. Pt is 57M with a past medical Hx inclusive of CAD, HF, DM 2, HLD, CKD, admitted with acute on chronic HF and hypertensive urgency.  Patient reports good PO intake at present and prior to admission. Patient denies any nausea/vomiting/diarrhea/constipation or difficulty chewing and swallowing.  Heart healthy and Consistent Carbohydrate diet education provided.

## 2018-05-02 NOTE — PROGRESS NOTE ADULT - SUBJECTIVE AND OBJECTIVE BOX
Date of Admission:    24 hour events:    Vital Signs Last 24 Hrs  T(C): 36.7 (02 May 2018 04:52), Max: 36.8 (01 May 2018 12:48)  T(F): 98 (02 May 2018 04:52), Max: 98.3 (01 May 2018 12:48)  HR: 70 (02 May 2018 04:52) (70 - 95)  BP: 112/62 (02 May 2018 04:52) (112/62 - 131/72)  BP(mean): --  RR: 18 (02 May 2018 04:52) (16 - 18)  SpO2: 100% (02 May 2018 04:52) (97% - 100%)  I&O's Summary    01 May 2018 07:01  -  02 May 2018 07:00  --------------------------------------------------------  IN: 0 mL / OUT: 2650 mL / NET: -2650 mL        MEDICATIONS:  aspirin enteric coated 81 milliGRAM(s) Oral daily  carvedilol 3.125 milliGRAM(s) Oral every 12 hours  clopidogrel Tablet 75 milliGRAM(s) Oral daily  digoxin     Tablet 0.125 milliGRAM(s) Oral daily  hydrALAZINE 10 milliGRAM(s) Oral every 8 hours  warfarin 6 milliGRAM(s) Oral once    cefTRIAXone   IVPB 1 Gram(s) IV Intermittent every 24 hours      acetaminophen   Tablet. 650 milliGRAM(s) Oral every 4 hours PRN  gabapentin 300 milliGRAM(s) Oral every 12 hours  melatonin 3 milliGRAM(s) Oral at bedtime PRN      atorvastatin 40 milliGRAM(s) Oral at bedtime  dextrose 50% Injectable 12.5 Gram(s) IV Push once  dextrose 50% Injectable 25 Gram(s) IV Push once  dextrose 50% Injectable 25 Gram(s) IV Push once  dextrose Gel 1 Dose(s) Oral once PRN  glucagon  Injectable 1 milliGRAM(s) IntraMuscular once PRN  insulin lispro (HumaLOG) corrective regimen sliding scale   SubCutaneous three times a day before meals  insulin lispro (HumaLOG) corrective regimen sliding scale   SubCutaneous at bedtime    dextrose 5%. 1000 milliLiter(s) IV Continuous <Continuous>  influenza   Vaccine 0.5 milliLiter(s) IntraMuscular once      REVIEW OF SYSTEMS:  Complete 10point ROS negative.    PHYSICAL EXAM:  General: NAD  Cardiovascular: Normal S1 S2, No JVD, No murmurs, No edema  Respiratory: Lungs clear to auscultation	  Gastrointestinal:  Soft, Non-tender, + BS	  Skin: warm and dry, No rashes, No ecchymoses, No cyanosis	  Extremities: Normal range of motion, No clubbing, cyanosis or edema  Vascular: Peripheral pulses palpable 2+ bilaterally    LABS:	 	    CBC Full  -  ( 02 May 2018 06:00 )  WBC Count : 6.36 K/uL  Hemoglobin : 12.6 g/dL  Hematocrit : 38.7 %  Platelet Count - Automated : 189 K/uL  Mean Cell Volume : 86.4 fL  Mean Cell Hemoglobin : 28.1 pg  Mean Cell Hemoglobin Concentration : 32.6 %  Auto Neutrophil # : x  Auto Lymphocyte # : x  Auto Monocyte # : x  Auto Eosinophil # : x  Auto Basophil # : x  Auto Neutrophil % : x  Auto Lymphocyte % : x  Auto Monocyte % : x  Auto Eosinophil % : x  Auto Basophil % : x    05-02    137  |  99  |  45<H>  ----------------------------<  151<H>  3.8   |  23  |  1.77<H>  05-01    138  |  95<L>  |  40<H>  ----------------------------<  151<H>  3.8   |  24  |  1.75<H>    Ca    9.0      02 May 2018 06:00  Ca    9.0      01 May 2018 05:30  Mg     2.5     05-02        proBNP:   Lipid Profile:   HgA1c:   TSH:       CARDIAC MARKERS:            TELEMETRY: 	    ECG:  	  RADIOLOGY:  ECHO:  OTHER: 	    PREVIOUS DIAGNOSTIC TESTING:    [ ] Echocardiogram:  [ ]  Catheterization:  [ ] Stress Test: Date of Admission:  4/25/18  24 hour events:  No overnight events  Vital Signs Last 24 Hrs  T(C): 36.7  T(F): 98.3  HR: 70 (70 - 95)  BP: 112/62 (02 May 2018 04:52) (112/62 - 131/72)  RR:  (16 - 18)  SpO2: 100%     Intake/Output  IN: 0 mL / OUT: 2650 mL / NET: -2650 mL    MEDICATIONS:  aspirin enteric coated 81 milliGRAM(s) Oral daily  carvedilol 3.125 milliGRAM(s) Oral every 12 hours  clopidogrel Tablet 75 milliGRAM(s) Oral daily  digoxin     Tablet 0.125 milliGRAM(s) Oral daily  hydrALAZINE 10 milliGRAM(s) Oral every 8 hours  warfarin 6 milliGRAM(s) Oral once  cefTRIAXone   IVPB 1 Gram(s) IV Intermittent every 24 hours  acetaminophen   Tablet. 650 milliGRAM(s) Oral every 4 hours PRN  gabapentin 300 milliGRAM(s) Oral every 12 hours  melatonin 3 milliGRAM(s) Oral at bedtime PRN  atorvastatin 40 milliGRAM(s) Oral at bedtime  dextrose 50% Injectable 12.5 Gram(s) IV Push once  dextrose 50% Injectable 25 Gram(s) IV Push once  dextrose 50% Injectable 25 Gram(s) IV Push once  dextrose Gel 1 Dose(s) Oral once PRN  glucagon  Injectable 1 milliGRAM(s) IntraMuscular once PRN  insulin lispro (HumaLOG) corrective regimen sliding scale   SubCutaneous three times a day before meals  insulin lispro (HumaLOG) corrective regimen sliding scale   SubCutaneous at bedtime  dextrose 5%. 1000 milliLiter(s) IV Continuous <Continuous>  influenza   Vaccine 0.5 milliLiter(s) IntraMuscular once      REVIEW OF SYSTEMS:  Complete 10point ROS negative.    PHYSICAL EXAM:  General: NAD  Cardiovascular: Normal S1 S2, No JVD, No murmurs, No edema  Respiratory: Lungs clear to auscultation	  Gastrointestinal:  Soft, Non-tender, + BS	  Skin: warm and dry, No rashes, No ecchymoses, No cyanosis	  Extremities: Normal range of motion, No clubbing, cyanosis or edema  Vascular: Peripheral pulses palpable 2+ bilaterally    LABS:	 	    CBC Full  -  ( 02 May 2018 06:00 )  WBC Count : 6.36 K/uL  Hemoglobin : 12.6 g/dL  Hematocrit : 38.7 %  Platelet Count - Automated : 189 K/uL  Mean Cell Volume : 86.4 fL  Mean Cell Hemoglobin : 28.1 pg  Mean Cell Hemoglobin Concentration : 32.6 %  Auto Neutrophil # : x  Auto Lymphocyte # : x  Auto Monocyte # : x  Auto Eosinophil # : x  Auto Basophil # : x  Auto Neutrophil % : x  Auto Lymphocyte % : x  Auto Monocyte % : x  Auto Eosinophil % : x  Auto Basophil % : x    05-02    137  |  99  |  45<H>  ----------------------------<  151<H>  3.8   |  23  |  1.77<H>  05-01    138  |  95<L>  |  40<H>  ----------------------------<  151<H>  3.8   |  24  |  1.75<H>    Ca    9.0      02 May 2018 06:00  Ca    9.0      01 May 2018 05:30  Mg     2.5     05-02

## 2018-05-02 NOTE — PROGRESS NOTE ADULT - ATTENDING COMMENTS
Agree with above assessment and plan.  Patient for nuclear stress test  Cont current meds Agree with above assessment and plan.  Patient for nuclear stress test  Cont current meds    Addendum:  #[id number]  Sister's Number Luz Melo [phone]  Long discussion with both patient and sister. Patient states that he had depressed EF diagnosed 2 years ago. Attempt at [facility] and aborted due to blockages in his legs. Attempt at medical management. ICD placed thereafter. Sister states he has been doing very well- she is primary caregiver- takes meds every day. Came to [place] for visit and admits to eating salt and not taking medications regularly. Nuclear stress Test results reveiwed and have call pending to cardiologist in [place] [phone] -Dr. Amin/Ximena Guthrie NP.  Will continue with medical therapy for now and discuss patient followup with outpatient [place] MD

## 2018-05-02 NOTE — DIETITIAN INITIAL EVALUATION ADULT. - PROBLEM SELECTOR PLAN 1
- 2/2 ADHF. Unclear what precipitated events, but patient may have been underdosing PO lasix.   - IV lasix 40 BID.  - Isodril 10 TID. Nitro gtt discontinued.   - will check repeat VBG and wean off BiPAP as tolerated.   - Strict I/O's.

## 2018-05-02 NOTE — PROGRESS NOTE ADULT - PROBLEM SELECTOR PLAN 1
Continue coreg, hydralazine, and digoxin. Imdur discontinued due to headache. Diuretics on hold given infection will need to re-evaluate dosing at discharge. Also will plan nuclear stress test (pharmaceutical) pre discharge for ischemia eval. Increase coreg to 6.25 bid, continue hydralazine 10 TID,

## 2018-05-02 NOTE — PROGRESS NOTE ADULT - PROBLEM SELECTOR PLAN 3
Afebrile, on ceftriaxone for 7 day course. Blood cultures neg X 24 hours. Afebrile, on ceftriaxone for 7 day course. Blood cultures neg X 24 hours.  -continue ceftriaxone until May 5th.

## 2018-05-02 NOTE — PROGRESS NOTE ADULT - PROBLEM SELECTOR PLAN 4
Creatinine appears at his baseline, continue to monitor. Creatinine at baseline. Continue to monitor

## 2018-05-02 NOTE — DIETITIAN INITIAL EVALUATION ADULT. - PROBLEM SELECTOR PLAN 2
- Hx of HFpEF of 25-30%, s/p AICD.   - home carvedilol dose decreased to 6.25 BID.   - IV lasix 40 BID.

## 2018-05-02 NOTE — DIETITIAN INITIAL EVALUATION ADULT. - PROBLEM SELECTOR PLAN 8
- Diet: consistent carbohydrate, DASH/TLC.   - DVT ppx:  - Dispo: home. No skilled PT needs.     Morgan Hanks MD   PGY1  Pager: [phone]

## 2018-05-03 VITALS — HEART RATE: 93 BPM | SYSTOLIC BLOOD PRESSURE: 135 MMHG | DIASTOLIC BLOOD PRESSURE: 70 MMHG

## 2018-05-03 LAB
APTT BLD: 38.1 SEC — HIGH (ref 27.5–37.4)
BUN SERPL-MCNC: 39 MG/DL — HIGH (ref 7–23)
CALCIUM SERPL-MCNC: 8.9 MG/DL — SIGNIFICANT CHANGE UP (ref 8.4–10.5)
CHLORIDE SERPL-SCNC: 98 MMOL/L — SIGNIFICANT CHANGE UP (ref 98–107)
CO2 SERPL-SCNC: 25 MMOL/L — SIGNIFICANT CHANGE UP (ref 22–31)
CREAT SERPL-MCNC: 1.66 MG/DL — HIGH (ref 0.5–1.3)
GLUCOSE BLDC GLUCOMTR-MCNC: 143 MG/DL — HIGH (ref 70–99)
GLUCOSE BLDC GLUCOMTR-MCNC: 282 MG/DL — HIGH (ref 70–99)
GLUCOSE SERPL-MCNC: 143 MG/DL — HIGH (ref 70–99)
INR BLD: 1.7 — HIGH (ref 0.88–1.17)
POTASSIUM SERPL-MCNC: 3.9 MMOL/L — SIGNIFICANT CHANGE UP (ref 3.5–5.3)
POTASSIUM SERPL-SCNC: 3.9 MMOL/L — SIGNIFICANT CHANGE UP (ref 3.5–5.3)
PROTHROM AB SERPL-ACNC: 19.8 SEC — HIGH (ref 9.8–13.1)
SODIUM SERPL-SCNC: 138 MMOL/L — SIGNIFICANT CHANGE UP (ref 135–145)

## 2018-05-03 PROCEDURE — 99239 HOSP IP/OBS DSCHRG MGMT >30: CPT

## 2018-05-03 RX ORDER — CARVEDILOL PHOSPHATE 80 MG/1
1 CAPSULE, EXTENDED RELEASE ORAL
Qty: 60 | Refills: 0 | OUTPATIENT
Start: 2018-05-03 | End: 2018-06-01

## 2018-05-03 RX ORDER — HYDRALAZINE HCL 50 MG
1 TABLET ORAL
Qty: 90 | Refills: 0 | OUTPATIENT
Start: 2018-05-03 | End: 2018-06-01

## 2018-05-03 RX ORDER — WARFARIN SODIUM 2.5 MG/1
8 TABLET ORAL ONCE
Qty: 0 | Refills: 0 | Status: DISCONTINUED | OUTPATIENT
Start: 2018-05-03 | End: 2018-05-03

## 2018-05-03 RX ORDER — FUROSEMIDE 40 MG
1 TABLET ORAL
Qty: 0 | Refills: 0 | COMMUNITY

## 2018-05-03 RX ORDER — CIPROFLOXACIN LACTATE 400MG/40ML
1 VIAL (ML) INTRAVENOUS
Qty: 12 | Refills: 0 | OUTPATIENT
Start: 2018-05-03 | End: 2018-05-08

## 2018-05-03 RX ORDER — CARVEDILOL PHOSPHATE 80 MG/1
1 CAPSULE, EXTENDED RELEASE ORAL
Qty: 0 | Refills: 0 | COMMUNITY

## 2018-05-03 RX ADMIN — CARVEDILOL PHOSPHATE 6.25 MILLIGRAM(S): 80 CAPSULE, EXTENDED RELEASE ORAL at 05:56

## 2018-05-03 RX ADMIN — Medication 0.12 MILLIGRAM(S): at 05:56

## 2018-05-03 RX ADMIN — Medication 3: at 12:56

## 2018-05-03 RX ADMIN — Medication 10 MILLIGRAM(S): at 13:00

## 2018-05-03 RX ADMIN — Medication 81 MILLIGRAM(S): at 12:56

## 2018-05-03 RX ADMIN — GABAPENTIN 300 MILLIGRAM(S): 400 CAPSULE ORAL at 05:56

## 2018-05-03 RX ADMIN — CLOPIDOGREL BISULFATE 75 MILLIGRAM(S): 75 TABLET, FILM COATED ORAL at 12:56

## 2018-05-03 RX ADMIN — Medication 10 MILLIGRAM(S): at 05:56

## 2018-05-03 NOTE — PROGRESS NOTE ADULT - PROBLEM SELECTOR PLAN 3
Afebrile, on ceftriaxone for 7 day course. Blood cultures neg X 24 hours.  -change to cipro 500mg po BID for another 6days

## 2018-05-03 NOTE — PROGRESS NOTE ADULT - PROBLEM SELECTOR PLAN 1
-Nuclear stress test was done yesterday  -Cardiologist from [facility] was notified of results, and medical management was determined to be the best course of action.   Increase coreg to 6.25 bid, continue hydralazine 10 TID,

## 2018-05-03 NOTE — PROGRESS NOTE ADULT - PROBLEM SELECTOR PROBLEM 4
BRADFORD (acute kidney injury)
UTI (urinary tract infection)
Acute kidney injury

## 2018-05-03 NOTE — PROGRESS NOTE ADULT - ASSESSMENT
57M with CAD, HFrEF, ICD, DM, HLD, afib on coumadin, and CKD presents with sudden onset SOB -- acute on chronic systolic HF exacerbation with hypoxic hypercapneic resp failure requiring diuresis, IV NTG, and BiPaP. Cardiac status improved. Patient received a nuclear stress test yesterday.  Course c/b + UTI, now on antibiotics.

## 2018-05-03 NOTE — PROGRESS NOTE ADULT - SUBJECTIVE AND OBJECTIVE BOX
Date of Admission:  4/26/18  24 hour events:  No overnight events  Vital Signs Last 24 Hrs  T(C): 36.4 (03 May 2018 05:52), Max: 36.8 (02 May 2018 14:32)  T(F): 97.5 (03 May 2018 05:52), Max: 98.3 (02 May 2018 14:32)  HR: 73 (03 May 2018 05:52) (73 - 84)  BP: 122/72 (03 May 2018 05:52) (110/50 - 129/72)  BP(mean): --  RR: 20 (03 May 2018 05:52) (17 - 20)  SpO2: 100% (03 May 2018 05:52) (100% - 100%)  I&O's Summary    02 May 2018 07:01  -  03 May 2018 07:00  --------------------------------------------------------  IN: 0 mL / OUT: 1940 mL / NET: -1940 mL    03 May 2018 07:01  -  03 May 2018 08:36  --------------------------------------------------------  IN: 0 mL / OUT: 300 mL / NET: -300 mL      MEDICATIONS:  aspirin enteric coated 81 milliGRAM(s) Oral daily  carvedilol 6.25 milliGRAM(s) Oral every 12 hours  clopidogrel Tablet 75 milliGRAM(s) Oral daily  digoxin     Tablet 0.125 milliGRAM(s) Oral daily  hydrALAZINE 10 milliGRAM(s) Oral every 8 hours  cefTRIAXone   IVPB 1 Gram(s) IV Intermittent every 24 hours  acetaminophen   Tablet. 650 milliGRAM(s) Oral every 4 hours PRN  gabapentin 300 milliGRAM(s) Oral every 12 hours  melatonin 3 milliGRAM(s) Oral at bedtime PRN  atorvastatin 40 milliGRAM(s) Oral at bedtime  dextrose 50% Injectable 12.5 Gram(s) IV Push once  dextrose 50% Injectable 25 Gram(s) IV Push once  dextrose 50% Injectable 25 Gram(s) IV Push once  dextrose Gel 1 Dose(s) Oral once PRN  glucagon  Injectable 1 milliGRAM(s) IntraMuscular once PRN  insulin lispro (HumaLOG) corrective regimen sliding scale   SubCutaneous three times a day before meals  insulin lispro (HumaLOG) corrective regimen sliding scale   SubCutaneous at bedtime    dextrose 5%. 1000 milliLiter(s) IV Continuous <Continuous>  influenza   Vaccine 0.5 milliLiter(s) IntraMuscular once      REVIEW OF SYSTEMS:  Complete 10point ROS negative.    PHYSICAL EXAM:  General: NAD  Cardiovascular: Normal S1 S2, No JVD, No murmurs, No edema  Respiratory: Lungs clear to auscultation	  Gastrointestinal:  Soft, Non-tender, + BS	  Skin: warm and dry, No rashes, No ecchymoses, No cyanosis	  Extremities: Normal range of motion, No clubbing, cyanosis or edema  Vascular: Peripheral pulses palpable 2+ bilaterally    LABS:	 	    CBC Full  -  ( 02 May 2018 06:00 )  WBC Count : 6.36 K/uL  Hemoglobin : 12.6 g/dL  Hematocrit : 38.7 %  Platelet Count - Automated : 189 K/uL  Mean Cell Volume : 86.4 fL  Mean Cell Hemoglobin : 28.1 pg  Mean Cell Hemoglobin Concentration : 32.6 %  Auto Neutrophil # : x  Auto Lymphocyte # : x  Auto Monocyte # : x  Auto Eosinophil # : x  Auto Basophil # : x  Auto Neutrophil % : x  Auto Lymphocyte % : x  Auto Monocyte % : x  Auto Eosinophil % : x  Auto Basophil % : x    05-02    137  |  99  |  45<H>  ----------------------------<  151<H>  3.8   |  23  |  1.77<H>    Ca    9.0      02 May 2018 06:00  Mg     2.5     05-02      [ ] Stress Test:  	< from: Nuclear Stress Test-Pharmacologic (05.02.18 @ 09:40) >  GATED ANALYSIS:  Post-stress gated wall motion analysis was performed (LVEF  = 18 %;LVEDV = 258 ml.), revealing severe hypokinesis.  ------------------------------------------------------------------------  IMPRESSIONS:Abnormal Study  * Myocardial Perfusion SPECT results are abnormal.  * There are large, severe defects in inferior and  inferolateral walls that are fixed, suggestive of  infarct.There are large, severe defects in anterior,  apical walls that are partially reversible, suggestive of  infarction with moderate yann-infarct ischemia i nthe  distal anterior wall.  * Post-stress gated wall motion analysis was performed  (LVEF = 18 %;LVEDV = 258 ml.), revealing severe  hypokinesis.  ------------------------------------------------------------------------  Confirmed on  5/2/2018 - 13:09:43 by Wendy Richardson MD    < end of copied text >

## 2018-05-03 NOTE — PROGRESS NOTE ADULT - ATTENDING COMMENTS
Patient seen and examined. Agree with above assessment and plan  Patient with fixed defect in inferior walls and moderate ischemia in the anterior walls however after discussion with doctors in [place], attempt at stent in the past and decision for medical mgmt. Would optimize medically for now- chest pain free and without dyspnea and will dc home with outpatient followup. Plan discussed with patient and sister Patient seen and examined. Agree with above assessment and plan  Patient with fixed defect in inferior walls and moderate ischemia in the anterior walls however after discussion with NP for cardiologist in [place], unclear if any ischemic workup done.  Will favor [facility] today, discussed with Dr. Bradshaw and proceed with discharge thereafter Patient seen and examined. Agree with above assessment and plan  Patient with fixed defect in inferior walls and moderate ischemia in the anterior walls however after discussion with NP for cardiologist in [place], unclear if any ischemic workup done.    Addendum: Discussed plan at length with patient's sister, who stats that Mr. Bowers did have angiogram done in [place] and was told there were blockages but "nothing could be done" and they stated needed medication and placed ICD.  Patient and sister are very hesitant to proceed with repeat LHC and would prefer to dc home and followup with Dr. Salazar in [place].  Patient and sister understand.

## 2018-05-03 NOTE — PROGRESS NOTE ADULT - PROBLEM SELECTOR PROBLEM 3
Acidosis
Acidosis
CAD (coronary artery disease)
UTI (urinary tract infection)
Fever

## 2018-05-03 NOTE — PROGRESS NOTE ADULT - PROBLEM SELECTOR PLAN 5
-Patient can be discharged home and follow up with cardiology clinic in 2 weeks, and will also need to follow up in [place] when he returns home.

## 2018-05-03 NOTE — PROGRESS NOTE ADULT - PROBLEM SELECTOR PLAN 2
CHADSVASC is 3, continue coumadin therapy for a goal INR of 2-3. Currently patient is subtherapeutic with INR of 1.86.

## 2018-05-03 NOTE — PROGRESS NOTE ADULT - PROBLEM SELECTOR PROBLEM 2
Atrial fibrillation
BRADFORD (acute kidney injury)
CHF exacerbation
CHF exacerbation
Atrial fibrillation

## 2018-05-03 NOTE — PROGRESS NOTE ADULT - PROBLEM SELECTOR PROBLEM 1
Acute on chronic systolic heart failure
Respiratory failure with hypoxia and hypercapnia
Respiratory failure with hypoxia and hypercapnia
Acute on chronic systolic congestive heart failure

## 2018-05-04 LAB
BACTERIA BLD CULT: SIGNIFICANT CHANGE UP
BACTERIA BLD CULT: SIGNIFICANT CHANGE UP

## 2019-05-07 NOTE — PATIENT PROFILE ADULT. - ABILITY TO HEAR (WITH HEARING AID OR HEARING APPLIANCE IF NORMALLY USED):
Occupational Therapy Evaluation    Visit Count: 1   Plan of Care: 5/7/2019 Through: 6/5/2019  Insurance Information: Payor: [employer]  Authorization Needed: No  Maximum Visit Limit Per Year: medical necessity  CoPay: $40.00  Referred by: Deonna Negron PA-C; Next provider visit (if known/scheduled): 5/16/19  Medical Diagnosis (from order): M25.532 Left wrist pain  Z98.890 Post-operative state  S52.532D Closed Colles' fracture of left radius with routine healing, subsequent encounter  M25.632 Stiffness of left wrist joint   Treatment Diagnosis: wrist symptoms with increased pain/symptoms, impaired strength, impaired range of motion    Date of Surgery: 2/20/19; Surgery performed: distal radius fracture with ORIF; Physician Guidelines yes  Diagnosis Precautions: none  Chart reviewed at time of initial evaluation (relevant co-morbidities, allergies, tests and medications listed):   Past Medical History:   Diagnosis Date   • Anxiety    • Bipolar 1 disorder (CMS/HCC)      SUBJECTIVE   Description of Problem and/or Mechanism of Injury: Patient relates falling onto arm and undergoing ORIF of radius and presents not to therapy 10 weeks and 6 days from surgery.   Pain:  Intensity (0-10 scale): Current: 5; Pain Range (best-worst): 0  Location: globally left wrist  Quality/Description: Ache  Relieving/Alleviating factors: avoiding movement in the involved area    Function:  Limitations/exacerbating factors: pain, difficulty, increased time to complete with all activities/tasks with involved extremity  Prior level: independent with all activities of daily living and instrumental activities of daily living  Personal Occupations Profile Affected: -------ADL, -------IADL  Patient Goal: better range of motion    Prior Treatment: no therapies in the past year for current condition. Hospitalization, home health services or skilled nursing facility in the last 30 days: No, per patient.    Home Environment/Social Support: Patient lives  Adequate: hears normal conversation without difficulty alone; assistance as needed from family/friends available.    Safety:  Do you feel safe at home, work and/or school? yes, per patient  Patient denies 2 or more falls or an unexplained fall with injury in the last year, further testing not required     OBJECTIVE   Observation:   Well healing and mobile incision over volar forearm - normal appearance to hand noted    Range of Motion (degrees)   Left Right   Date Initial Initial   Forearm Supination (90) 60    Forearm Pronation (90)     Wrist Flexion (80) 35    Wrist Extension (70) 45    Wrist Radial Deviation (20) 15    Wrist Ulnar Deviation (45) 30    Reported in degrees, active range of motion recorded unless noted as AA=active assistive or P=passive; standard testing positions unless otherwise noted, norms included in ( ); *=pain         Only those motions that were assessed are noted.    Strength (out of 5)   Left Right   Date Initial Initial   Wrist Flexors 4-/5    Wrist Extensors  4-/5    standard testing positions unless otherwise noted; *=pain   (pounds of force)   Left Right    Initial Initial   Flexed Elbow  Ave: [address]: 59   standard testing positions unless otherwise noted,* denotes pain, trial of 3 maybe reported \"1/2/3\",   Norms: : 35-39 years, male: left 91.2-134.6, right 95.7-143.7; female: left 54.6-78, right 63.3-84.9  Only muscle strength that was assessed are noted.    Palpation:  Some pain with palpation to S-L interval    Joint Play Assessment:  None     Special Tests:  Scaphoid shift test - negative    Initial Treatment   Initial evaluation completed.    Therapeutic Activity:  Educated patient in role of OT and plan of care    Educated patient in thermal modalities to increase soft tissue pliability - trials of paraffin and fluido but patient not wanting to complete modality today.     Educated patient in HEP as indicated below    Educated patient in no splint wear to achieve normalcy with functional movement - good  understanding    Fluidotherapy (82225): Unbilled due to no treatment completed  Prepared area per protocol.    Air speed: 50% Temperature: 108° F   Position: sitting Duration: 3 minutes   Results: no change in symptoms immediately following modality; no adverse reaction to treatment    Skilled input: verbal instruction/cues, tactile instruction/cues, facilitation, inhibition    Initial Home Program:  * above=instructed home program    Exercise: Date issued Date DC Comments   Prayer stretch for wrist extension  Wrist extension active  Assisted forearm supination 5/8/19                               Writer verbally educated the patient and received verbal consent from the patient on hand placement, positioning of patient, and techniques to be performed today including therapist position for techniques, hand placement and palpation for techniques, modality application as described above and how they are pertinent to the patient's plan of care.     Suggestions for next session as indicated: progress per plan of care, thermal modalities, progress functional motion and efficacy with completion    ASSESSMENT   40 year old male patient has signs and symptoms consistent with s/p ORIF to distal radius and longer than normal immobilization due to patient not starting therapy soon after order was placed that has reported functional limitations listed above.     Patient will benefit from skilled therapy and rehab potential is good based on assessment above   Clinical decision making: Low - Patient has few limitations (1-3), comorbidities and/or complexities, as noted in problem focused assessment noted above, that impact their occupational profile.  Resulting in few treatment options and no task modification consistent with low clinical decision making complexity.    PLAN   Goals: To be obtained by end of this plan of care:  1. Patient will be independent with progressed and modified home exercise program   2. Decrease  pain/symptoms to 0-1/10  3. Improve involved range of motion to 60 degrees wrist extension and full composite fist motion  through improvements listed above patient will:  4. Achieve active fingertip to palm composite flexion for resumption of small object grasp and hold, ½ inch diameter tool use (eating utensil, toothbrush) and wring out wash cloth  5. Be able to  and lift a light grocery bag,  steering wheel, perform light home/yard maintenance tasks with minimal pain/difficulty  6. Improve coordination speed for resumption of work related tasks, leisure pursuits, playing of musical instrument, writing, typing  7. Quick DASH: Patient will complete form to reflect an improved score from initial score of TBA to less than or equal to TBA to indicate patient reported improvement in function/disability/impairment.    The following skilled interventions to be implemented to achieve above:  Activities of Daily Living/Self Care (85981)  Manual Therapy (94234)  Neuromuscular Re-Education (13519)  Therapeutic Activity (01989)  Therapeutic Exercise (11229)  Fluidotherapy/Whirlpool (13355/76776)  Heat/Cold (73269)  Ultrasound/Phonophoresis (56773)  Paraffin (39681)  Splinting/Orthotics     Frequency/Duration: 1-2 times per week for 4 weeks with tapering as the patient progresses for an estimated total of 3-6 visits    patient involved in and agreed to plan of care and goals.   Attendance policy provided at time of evaluation.      Patient Education:   Who will be receiving education: patient  Are they ready to learn: yes  Preferred learning style: written, verbal, demonstration  Barriers to learning: no barriers apparent at this time   Result of initial outlined education: Verbalizes understanding, Demonstrates understanding and Needs reinforcement    THERAPY DAILY BILLING   Insurance: 1. [employer] MEDICARE 2.     Evaluation Procedures:  Occupational Therapy Evaluation: Low Complexity    Timed Procedures:  Therapeutic  Activity, 15 minutes    Untimed Procedures:  No untimed codes were used on this date of service    Total Treatment Time: 45 minutes    The referring provider's electronic or written signature on the evaluation authorizes the therapy plan of care and certifies the need for these services, furnished under this plan of care while under their care.  Electronically sent for referring provider signature

## 2019-11-18 NOTE — PATIENT PROFILE ADULT. - BRADEN SCORE (IF 18 OR LESS ACTIVATE SKIN INJURY RISK INCREASED GUIDELINE), MLM
November 18, 2019      [facility] [address] [place]  [facility] [address]  Phone: [phone]  Fax: [phone]       Patient: Moises Padilla   YOB: 2000  Date of Visit: 11/18/2019    To Whom It May Concern:    Segun Padilla  was at [facility] on 11/18/2019. He may return to work/school on 11/22/19 with no restrictions OR sooner IF fever free for 24 hours (fever is 100.4F or greater). If you have any questions or concerns, or if I can be of further assistance, please do not hesitate to contact me.    Sincerely,        Marisa Mcgrath, NP     
19

## 2023-10-06 NOTE — H&P ADULT - NSHPSOCIALHISTORY_GEN_ALL_CORE
Allergen Immunotherapy: Adult  Verified patient's name and . Patient stated reason for visit today is to receive allergy shot(s). Patient denied any concerns with previous allergy injections. Allergy injections (x2) prepared and administered subcutaneous. Ice applied post-injection per patient preference. Administration of allergy injection documented in Immunotherapy flow sheet in EHR and paper chart (see for further information regarding injection). Patient left clinic ambulatory, AMA.     Amy Simms RN ....................  10/6/2023   4:03 PM    
Quit EtOH, smoking about a year ago. Prior to that, 2ppd x 35 yrs for smoking, 5-6 beers per day.   Lives with sister at home.

## 2025-06-12 NOTE — DIETITIAN INITIAL EVALUATION ADULT. - NS AS NUTRI INTERV ED CONTENT
Seen in the emergency department for change in mentation.  Found to test positive for rhinovirus/enterovirus.  Septic workup was pursued which was unremarkable for other infections.  No need for antibiotics.  He received a total of 1 L normal saline IV fluids.  He received a dose of vancomycin and Zosyn on initial evaluation due to your presentation.  Again no further antibiotics needed.  Please return to the emergency department if you develop worsened symptoms, increased work of breathing, episodes of passing out, fevers, chest pains, shortness of breath, wheezing, difficulty breathing, abdominal pain/distention, neurostatus changes, or any other concerning symptoms.  CT head and chest x-ray unremarkable for acute abnormalities.  Please follow-up with your primary care within the next 1 to 2 days.  Urinary analysis without infection.    Recommend that skilled nursing facility suction nasal congestion, posterior oropharynx congestion as needed while at the facility.  Can take Tylenol for symptomatic management.  This is on your home medication list.  Please take it as it is prescribed   Purpose of the nutrition education/Nutrition relationship to health/disease/Survival information/Priority modifications